# Patient Record
Sex: FEMALE | Race: BLACK OR AFRICAN AMERICAN | NOT HISPANIC OR LATINO
[De-identification: names, ages, dates, MRNs, and addresses within clinical notes are randomized per-mention and may not be internally consistent; named-entity substitution may affect disease eponyms.]

---

## 2018-01-18 ENCOUNTER — APPOINTMENT (OUTPATIENT)
Dept: DERMATOLOGY | Facility: CLINIC | Age: 38
End: 2018-01-18
Payer: COMMERCIAL

## 2018-01-18 VITALS
BODY MASS INDEX: 27.32 KG/M2 | HEIGHT: 66 IN | DIASTOLIC BLOOD PRESSURE: 70 MMHG | SYSTOLIC BLOOD PRESSURE: 110 MMHG | WEIGHT: 170 LBS

## 2018-01-18 DIAGNOSIS — L73.9 FOLLICULAR DISORDER, UNSPECIFIED: ICD-10-CM

## 2018-01-18 DIAGNOSIS — Z12.83 ENCOUNTER FOR SCREENING FOR MALIGNANT NEOPLASM OF SKIN: ICD-10-CM

## 2018-01-18 DIAGNOSIS — L81.4 OTHER MELANIN HYPERPIGMENTATION: ICD-10-CM

## 2018-01-18 DIAGNOSIS — L82.1 OTHER SEBORRHEIC KERATOSIS: ICD-10-CM

## 2018-01-18 PROCEDURE — 99203 OFFICE O/P NEW LOW 30 MIN: CPT | Mod: 25

## 2018-01-18 PROCEDURE — 11900 INJECT SKIN LESIONS </W 7: CPT

## 2018-01-18 RX ORDER — CLINDAMYCIN PHOSPHATE 1 G/10ML
1 GEL TOPICAL
Qty: 1 | Refills: 3 | Status: ACTIVE | COMMUNITY
Start: 2018-01-18 | End: 1900-01-01

## 2018-01-19 PROBLEM — L73.9 FOLLICULITIS: Status: ACTIVE | Noted: 2018-01-18

## 2018-01-19 PROBLEM — L82.1 SEBORRHEIC KERATOSES: Status: ACTIVE | Noted: 2018-01-19

## 2018-10-08 ENCOUNTER — APPOINTMENT (OUTPATIENT)
Dept: ORTHOPEDIC SURGERY | Facility: CLINIC | Age: 38
End: 2018-10-08
Payer: COMMERCIAL

## 2018-10-08 VITALS
DIASTOLIC BLOOD PRESSURE: 75 MMHG | SYSTOLIC BLOOD PRESSURE: 106 MMHG | BODY MASS INDEX: 28.93 KG/M2 | HEART RATE: 86 BPM | HEIGHT: 66 IN | WEIGHT: 180 LBS

## 2018-10-08 DIAGNOSIS — S86.912A STRAIN OF UNSPECIFIED MUSCLE(S) AND TENDON(S) AT LOWER LEG LEVEL, LEFT LEG, INITIAL ENCOUNTER: ICD-10-CM

## 2018-10-08 PROCEDURE — 73562 X-RAY EXAM OF KNEE 3: CPT | Mod: LT

## 2018-10-08 PROCEDURE — 99203 OFFICE O/P NEW LOW 30 MIN: CPT

## 2018-11-12 ENCOUNTER — APPOINTMENT (OUTPATIENT)
Dept: ORTHOPEDIC SURGERY | Facility: CLINIC | Age: 38
End: 2018-11-12
Payer: COMMERCIAL

## 2018-11-12 VITALS
DIASTOLIC BLOOD PRESSURE: 78 MMHG | SYSTOLIC BLOOD PRESSURE: 119 MMHG | BODY MASS INDEX: 28.93 KG/M2 | WEIGHT: 180 LBS | HEART RATE: 87 BPM | HEIGHT: 66 IN

## 2018-11-12 DIAGNOSIS — M75.41 IMPINGEMENT SYNDROME OF RIGHT SHOULDER: ICD-10-CM

## 2018-11-12 DIAGNOSIS — M24.852 OTHER SPECIFIC JOINT DERANGEMENTS OF LEFT HIP, NOT ELSEWHERE CLASSIFIED: ICD-10-CM

## 2018-11-12 PROCEDURE — 99214 OFFICE O/P EST MOD 30 MIN: CPT

## 2018-11-12 PROCEDURE — 73502 X-RAY EXAM HIP UNI 2-3 VIEWS: CPT

## 2018-11-12 PROCEDURE — 73030 X-RAY EXAM OF SHOULDER: CPT | Mod: RT

## 2018-11-18 ENCOUNTER — APPOINTMENT (OUTPATIENT)
Dept: MRI IMAGING | Facility: CLINIC | Age: 38
End: 2018-11-18
Payer: COMMERCIAL

## 2018-11-18 ENCOUNTER — OUTPATIENT (OUTPATIENT)
Dept: OUTPATIENT SERVICES | Facility: HOSPITAL | Age: 38
LOS: 1 days | End: 2018-11-18
Payer: COMMERCIAL

## 2018-11-18 DIAGNOSIS — M75.41 IMPINGEMENT SYNDROME OF RIGHT SHOULDER: ICD-10-CM

## 2018-11-18 DIAGNOSIS — Z98.89 OTHER SPECIFIED POSTPROCEDURAL STATES: Chronic | ICD-10-CM

## 2018-11-18 PROCEDURE — 73221 MRI JOINT UPR EXTREM W/O DYE: CPT

## 2018-11-18 PROCEDURE — 73221 MRI JOINT UPR EXTREM W/O DYE: CPT | Mod: 26,RT

## 2018-11-27 ENCOUNTER — APPOINTMENT (OUTPATIENT)
Dept: INTERVENTIONAL RADIOLOGY/VASCULAR | Facility: CLINIC | Age: 38
End: 2018-11-27

## 2018-12-03 ENCOUNTER — APPOINTMENT (OUTPATIENT)
Dept: ORTHOPEDIC SURGERY | Facility: CLINIC | Age: 38
End: 2018-12-03
Payer: COMMERCIAL

## 2018-12-03 VITALS
HEART RATE: 86 BPM | BODY MASS INDEX: 27.32 KG/M2 | SYSTOLIC BLOOD PRESSURE: 116 MMHG | WEIGHT: 170 LBS | HEIGHT: 66 IN | DIASTOLIC BLOOD PRESSURE: 87 MMHG

## 2018-12-03 DIAGNOSIS — M75.01 ADHESIVE CAPSULITIS OF RIGHT SHOULDER: ICD-10-CM

## 2018-12-03 PROCEDURE — 99214 OFFICE O/P EST MOD 30 MIN: CPT

## 2018-12-08 PROBLEM — M75.01 ADHESIVE CAPSULITIS OF RIGHT SHOULDER: Status: ACTIVE | Noted: 2018-12-08

## 2018-12-11 ENCOUNTER — APPOINTMENT (OUTPATIENT)
Dept: RADIOLOGY | Facility: CLINIC | Age: 38
End: 2018-12-11
Payer: COMMERCIAL

## 2018-12-11 ENCOUNTER — OUTPATIENT (OUTPATIENT)
Dept: OUTPATIENT SERVICES | Facility: HOSPITAL | Age: 38
LOS: 1 days | End: 2018-12-11
Payer: COMMERCIAL

## 2018-12-11 DIAGNOSIS — Z00.8 ENCOUNTER FOR OTHER GENERAL EXAMINATION: ICD-10-CM

## 2018-12-11 DIAGNOSIS — Z98.89 OTHER SPECIFIED POSTPROCEDURAL STATES: Chronic | ICD-10-CM

## 2018-12-11 PROCEDURE — 73040 CONTRAST X-RAY OF SHOULDER: CPT | Mod: 26,RT

## 2018-12-11 PROCEDURE — 73040 CONTRAST X-RAY OF SHOULDER: CPT

## 2018-12-11 PROCEDURE — 23350 INJECTION FOR SHOULDER X-RAY: CPT | Mod: RT

## 2018-12-11 PROCEDURE — 23350 INJECTION FOR SHOULDER X-RAY: CPT

## 2020-05-27 ENCOUNTER — APPOINTMENT (OUTPATIENT)
Dept: OBGYN | Facility: CLINIC | Age: 40
End: 2020-05-27

## 2020-08-19 ENCOUNTER — APPOINTMENT (OUTPATIENT)
Dept: OBGYN | Facility: CLINIC | Age: 40
End: 2020-08-19
Payer: COMMERCIAL

## 2020-08-19 VITALS
WEIGHT: 162 LBS | BODY MASS INDEX: 26.03 KG/M2 | HEIGHT: 66 IN | DIASTOLIC BLOOD PRESSURE: 60 MMHG | SYSTOLIC BLOOD PRESSURE: 100 MMHG

## 2020-08-19 DIAGNOSIS — Z01.419 ENCOUNTER FOR GYNECOLOGICAL EXAMINATION (GENERAL) (ROUTINE) W/OUT ABNORMAL FINDINGS: ICD-10-CM

## 2020-08-19 PROCEDURE — 99385 PREV VISIT NEW AGE 18-39: CPT

## 2020-08-21 NOTE — HISTORY OF PRESENT ILLNESS
[Definite:  ___ (Date)] : the last menstrual period was [unfilled] [Normal Amount/Duration] : was of a normal amount and duration [Regular Cycle Intervals] : periods have been regular [Frequency: Q ___ days] : menstrual periods occur approximately every [unfilled] days [Menarche Age: ____] : age at menarche was [unfilled] [Menstrual Cramps] : menstrual cramps [No] : no [On BCP at conception] : the patient was not on BCP at conception [Spotting Between  Menses] : no spotting between menses [Sexually Active] : is not sexually active [Contraception] : does not use contraception

## 2020-08-24 LAB — CYTOLOGY CVX/VAG DOC THIN PREP: NORMAL

## 2020-12-11 ENCOUNTER — TRANSCRIPTION ENCOUNTER (OUTPATIENT)
Age: 40
End: 2020-12-11

## 2021-01-07 ENCOUNTER — TRANSCRIPTION ENCOUNTER (OUTPATIENT)
Age: 41
End: 2021-01-07

## 2021-01-19 ENCOUNTER — TRANSCRIPTION ENCOUNTER (OUTPATIENT)
Age: 41
End: 2021-01-19

## 2021-01-26 ENCOUNTER — TRANSCRIPTION ENCOUNTER (OUTPATIENT)
Age: 41
End: 2021-01-26

## 2021-07-22 ENCOUNTER — TRANSCRIPTION ENCOUNTER (OUTPATIENT)
Age: 41
End: 2021-07-22

## 2021-09-13 ENCOUNTER — TRANSCRIPTION ENCOUNTER (OUTPATIENT)
Age: 41
End: 2021-09-13

## 2021-09-21 ENCOUNTER — TRANSCRIPTION ENCOUNTER (OUTPATIENT)
Age: 41
End: 2021-09-21

## 2021-09-28 ENCOUNTER — TRANSCRIPTION ENCOUNTER (OUTPATIENT)
Age: 41
End: 2021-09-28

## 2021-10-05 ENCOUNTER — TRANSCRIPTION ENCOUNTER (OUTPATIENT)
Age: 41
End: 2021-10-05

## 2022-02-05 ENCOUNTER — TRANSCRIPTION ENCOUNTER (OUTPATIENT)
Age: 42
End: 2022-02-05

## 2022-02-23 ENCOUNTER — APPOINTMENT (OUTPATIENT)
Dept: ALLERGY | Facility: CLINIC | Age: 42
End: 2022-02-23
Payer: COMMERCIAL

## 2022-02-23 VITALS
TEMPERATURE: 98.5 F | RESPIRATION RATE: 16 BRPM | OXYGEN SATURATION: 98 % | DIASTOLIC BLOOD PRESSURE: 60 MMHG | SYSTOLIC BLOOD PRESSURE: 108 MMHG | HEART RATE: 76 BPM

## 2022-02-23 DIAGNOSIS — L23.9 ALLERGIC CONTACT DERMATITIS, UNSPECIFIED CAUSE: ICD-10-CM

## 2022-02-23 PROCEDURE — 95018 ALL TSTG PERQ&IQ DRUGS/BIOL: CPT

## 2022-02-23 PROCEDURE — 95004 PERQ TESTS W/ALRGNC XTRCS: CPT

## 2022-02-23 PROCEDURE — 99203 OFFICE O/P NEW LOW 30 MIN: CPT | Mod: 25

## 2022-02-23 RX ORDER — MOMETASONE FUROATE 1 MG/G
0.1 CREAM TOPICAL DAILY
Qty: 1 | Refills: 0 | Status: ACTIVE | COMMUNITY
Start: 2022-02-23 | End: 1900-01-01

## 2022-02-23 RX ORDER — HYDROCORTISONE 25 MG/G
2.5 CREAM TOPICAL
Qty: 28 | Refills: 0 | Status: ACTIVE | COMMUNITY
Start: 2021-10-22

## 2022-02-23 RX ORDER — TACROLIMUS 1 MG/G
0.1 OINTMENT TOPICAL
Qty: 100 | Refills: 0 | Status: ACTIVE | COMMUNITY
Start: 2021-10-22

## 2022-02-23 RX ORDER — TRETINOIN 0.25 MG/G
0.03 CREAM TOPICAL
Qty: 45 | Refills: 0 | Status: ACTIVE | COMMUNITY
Start: 2021-11-15

## 2022-02-23 RX ORDER — MUPIROCIN 20 MG/G
2 OINTMENT TOPICAL
Qty: 1 | Refills: 0 | Status: DISCONTINUED | COMMUNITY
Start: 2018-01-18 | End: 2022-02-23

## 2022-02-23 RX ORDER — ERYTHROMYCIN 5 MG/G
5 OINTMENT OPHTHALMIC
Qty: 4 | Refills: 0 | Status: ACTIVE | COMMUNITY
Start: 2021-11-09

## 2022-02-23 NOTE — SOCIAL HISTORY
[House] : [unfilled] lives in a house  [None] : none [Single] : single [de-identified] : lives alone  [FreeTextEntry2] :   [Smokers in Household] : there are no smokers in the home

## 2022-02-23 NOTE — ASSESSMENT
[FreeTextEntry1] : Contact Dermatitis :\par \par No evidence of food allergies\par Elocon cream QD\par RV patch testing

## 2022-02-23 NOTE — HISTORY OF PRESENT ILLNESS
[Asthma] : asthma [Allergic Rhinitis] : allergic rhinitis [Food Allergies] : food allergies [de-identified] : Patient reports 3 weeks ago she awoke with itchy chest - rash on chest - neck - [facility] MD - Claritin and topical hydrocortisone - the rash gradually improved - the rash spread to face and neck.   She is applying Gater Balm to her skin.   Similar reaction in the past about 1 year ago on her neck - she eliminated wine from her diet. \par \par History of eczema - side and back - sample of topical steroid \par \par Facial acne - clindamycin - triretinoin.

## 2022-02-23 NOTE — PHYSICAL EXAM
[Alert] : alert [Well Nourished] : well nourished [Healthy Appearance] : healthy appearance [No Acute Distress] : no acute distress [Well Developed] : well developed [Normal Voice/Communication] : normal voice communication [No Neck Mass] : no neck mass was observed [No LAD] : no lymphadenopathy [No Thyroid Mass] : no thyroid mass [Supple] : the neck was supple [Normal Rate and Effort] : normal respiratory rhythm and effort [No Crackles] : no crackles [No Retractions] : no retractions [Bilateral Audible Breath Sounds] : bilateral audible breath sounds [Wheezing] : no wheezing was heard [Normal Rate] : heart rate was normal  [Normal S1, S2] : normal S1 and S2 [No murmur] : no murmur [Regular Rhythm] : with a regular rhythm [Normal Cervical Lymph Nodes] : cervical [Patches] : ~M patches present [de-identified] : mild erythema neck and face

## 2022-02-28 ENCOUNTER — APPOINTMENT (OUTPATIENT)
Dept: ALLERGY | Facility: CLINIC | Age: 42
End: 2022-02-28
Payer: COMMERCIAL

## 2022-02-28 PROCEDURE — 95044 PATCH/APPLICATION TESTS: CPT

## 2022-03-02 ENCOUNTER — APPOINTMENT (OUTPATIENT)
Dept: ALLERGY | Facility: CLINIC | Age: 42
End: 2022-03-02
Payer: COMMERCIAL

## 2022-03-02 PROCEDURE — 99211 OFF/OP EST MAY X REQ PHY/QHP: CPT

## 2022-03-07 ENCOUNTER — APPOINTMENT (OUTPATIENT)
Dept: ALLERGY | Facility: CLINIC | Age: 42
End: 2022-03-07
Payer: COMMERCIAL

## 2022-03-07 PROCEDURE — 99213 OFFICE O/P EST LOW 20 MIN: CPT | Mod: 95

## 2022-03-07 NOTE — ASSESSMENT
[FreeTextEntry1] : Contact Dermatitis:\par \par I have reviewed results of patch testing and avoidance of ethylacrylate - propolis - bacitracin.   Patient brought to my attention that she has greg plants in her home and there is a sap released and she has on occasion used a plant based oil on her face. \par \par This visit was provided via telehealth using real time 2-way audio visual technology.  The patient, Renuka Mix, was located at home, at the time of the visit.   The provider, Mitchell Boxer, M.D., was located at the office, [address] [place] [address], at the time of the visit.\par \par The patient, Renuka Mix and physician, Mitchell Boxer, M.D., participated in the telehealth encounter.\par \par Verbal consent obtained by  from patient.\par \par The majority of time (>50%) was spent on counseling and coordination of care with this patient and/or family member.  The diagnosis - contact dermatitis \par

## 2022-03-07 NOTE — HISTORY OF PRESENT ILLNESS
[Home] : at home, [unfilled] , at the time of the visit. [Medical Office: ([facility])___] : at the medical office located in  [Verbal consent obtained from patient] : the patient, [unfilled] [de-identified] : Review of results of patch testing

## 2022-03-07 NOTE — PHYSICAL EXAM
[Alert] : alert [Well Nourished] : well nourished [No Acute Distress] : no acute distress [Healthy Appearance] : healthy appearance [Well Developed] : well developed [Normal Voice/Communication] : normal voice communication [Normal Mood] : mood was normal [Normal Affect] : affect was normal [Judgment and Insight Age Appropriate] : judgement and insight is age appropriate [Alert, Awake, Oriented as Age-Appropriate] : alert, awake, oriented as age appropriate No

## 2022-05-09 ENCOUNTER — APPOINTMENT (OUTPATIENT)
Dept: ORTHOPEDIC SURGERY | Facility: CLINIC | Age: 42
End: 2022-05-09
Payer: COMMERCIAL

## 2022-05-09 DIAGNOSIS — M54.50 LOW BACK PAIN, UNSPECIFIED: ICD-10-CM

## 2022-05-09 PROCEDURE — 73564 X-RAY EXAM KNEE 4 OR MORE: CPT | Mod: RT

## 2022-05-09 PROCEDURE — 99204 OFFICE O/P NEW MOD 45 MIN: CPT

## 2022-05-09 RX ORDER — IBUPROFEN 800 MG/1
800 TABLET, FILM COATED ORAL 3 TIMES DAILY
Qty: 30 | Refills: 0 | Status: ACTIVE | COMMUNITY
Start: 2022-05-09 | End: 1900-01-01

## 2022-05-24 ENCOUNTER — APPOINTMENT (OUTPATIENT)
Dept: PHYSICAL MEDICINE AND REHAB | Facility: CLINIC | Age: 42
End: 2022-05-24
Payer: COMMERCIAL

## 2022-06-07 ENCOUNTER — APPOINTMENT (OUTPATIENT)
Dept: PHYSICAL MEDICINE AND REHAB | Facility: CLINIC | Age: 42
End: 2022-06-07
Payer: COMMERCIAL

## 2022-06-07 VITALS
SYSTOLIC BLOOD PRESSURE: 128 MMHG | DIASTOLIC BLOOD PRESSURE: 86 MMHG | HEART RATE: 82 BPM | OXYGEN SATURATION: 98 % | TEMPERATURE: 97.5 F

## 2022-06-07 DIAGNOSIS — M22.2X2 PATELLOFEMORAL DISORDERS, RIGHT KNEE: ICD-10-CM

## 2022-06-07 DIAGNOSIS — M54.17 RADICULOPATHY, LUMBOSACRAL REGION: ICD-10-CM

## 2022-06-07 DIAGNOSIS — M53.3 SACROCOCCYGEAL DISORDERS, NOT ELSEWHERE CLASSIFIED: ICD-10-CM

## 2022-06-07 DIAGNOSIS — S33.6XXA SPRAIN OF SACROILIAC JOINT, INITIAL ENCOUNTER: ICD-10-CM

## 2022-06-07 DIAGNOSIS — M25.652 STIFFNESS OF LEFT HIP, NOT ELSEWHERE CLASSIFIED: ICD-10-CM

## 2022-06-07 DIAGNOSIS — M22.2X1 PATELLOFEMORAL DISORDERS, RIGHT KNEE: ICD-10-CM

## 2022-06-07 DIAGNOSIS — M53.84 OTHER SPECIFIED DORSOPATHIES, THORACIC REGION: ICD-10-CM

## 2022-06-07 PROCEDURE — 99205 OFFICE O/P NEW HI 60 MIN: CPT

## 2022-06-07 RX ORDER — IBUPROFEN 600 MG/1
600 TABLET, FILM COATED ORAL
Qty: 25 | Refills: 0 | Status: ACTIVE | COMMUNITY
Start: 2022-05-09

## 2022-06-07 RX ORDER — AMOXICILLIN 875 MG/1
875 TABLET, FILM COATED ORAL
Qty: 14 | Refills: 0 | Status: ACTIVE | COMMUNITY
Start: 2022-05-09

## 2022-06-07 RX ORDER — LORATADINE 10 MG/1
10 TABLET ORAL
Qty: 7 | Refills: 0 | Status: ACTIVE | COMMUNITY
Start: 2022-02-05

## 2022-06-07 RX ORDER — MELOXICAM 15 MG/1
15 TABLET ORAL
Qty: 30 | Refills: 1 | Status: ACTIVE | COMMUNITY
Start: 2022-06-07 | End: 1900-01-01

## 2022-06-07 NOTE — ASSESSMENT
[FreeTextEntry1] :                                                       Assessment/Plan:\par \par TALYA MUNOZ is a 42 year female with paroxysmal midline sacral/sacrococcygeal pain here for initial consultation.\par \par 1. 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 Hip Flexor Tightness\par 3. Sacrococcygeal pain\par 4. Sprain of the sacroiliac joint region\par 5. Decreased external rotation, hip extension on the Left\par 6. Decreased thoracolumbar range of motion\par \par - Tiers of treatment and management of above diagnosis(es) were discussed with patient\par - Optimal diet, weight, sleep, and lifestyle management to minimize stress and maximize well being counseling provided\par - Imaging reviewed and discussed with patient\par - Reviewed previous encounter notes from 5/9/22 Dr. WILLIAM Zepeda (Orthopedic Sports Medicine)\par - Patient was advised to start a structured, targeted therapy program 2-3x/wk for 8 wks with goal toward HEP\par - Patient was educated on an appropriate home exercise program, provided with exercise recommendations, all questions answered\par - Patient may trial acetaminophen 1000mg up to TID PRN moderate to severe pain and to decrease average consumption of NSAIDs\par - Patient was advised to start Meloxicam 15mg daily PRN with food/milk to help with pain and to decrease inflammation, afterwards as needed; she may use in lieu of ibuprofen 800mg\par - Patient may trial topical lidocaine patches 5% and/or aspercreme with lidocaine patches, 12 hours on and 12 hours off\par - Patient was advised to apply cool compresses or warm heat to affected regions PRN\par - Radiographs of lumbar spine and coccyx ordered today \par \par - Educated about red flag symptoms including (but not limited to) new, worsened, or persistent: fever greater than 100F, bowel or bladder incontinence, bowel obstipation, inability to void urine, urinary leakage, Severe nausea or vomiting, Worsening numbness, worsening tingling/paresthesias, and/or new or progressive motor weakness; advised to seek immediate medical attention at his nearest Emergency department should they experience any of the above\par \par - Follow up in 2 months \par \par I have personally spent a total of at least 60 minutes preparing, reviewing internal and external records, explaining, counseling, and coordinating care for this patient encounter.\par \par Thank you, PIYUSH FRANZ, for allowing me to participate in the care of your patient. Please do not hesitate to contact me with questions/concerns.\par \par Nithin Calhoun M.D.\par Sports and Interventional Spine\par Department of Physical Medicine and Rehabilitation \par [facility] \par Email: [email or web address] \par [facility] [place] \par [place] [facility]\par [address]\[place] [address]\par \par Appointments: [phone]\par Fax: [phone]

## 2022-06-07 NOTE — PHYSICAL EXAM
[FreeTextEntry1] : Gen: A+O x 3 in NAD\par Psych: Normal mood and affect. Responds appropriately to commands\par Eyes: Anicteric. No discharge. EOMI.\par Resp: Breathing unlabored\par CV: DP pulses 2+ and equal. No varicosities noted\par Ext: No c/c/e\par Skin: No lesions noted\par \par Gait: Non antalgic over 200 feet, normal reciprocating heel to toe, able to stand on toes and heels. \par Tandem gait intact\par Romberg negative\par \par Neg Trendelenburg sign with single leg stance\par \par Inspection: Spine alignment is midline.\par Palpation: There is + tenderness over the midline sacral region and over the b/l SIJ\par + over the b/l paravertebral muscles also\par Lumbar ROM: Flexion, extension, side-bending, rotation, limited in most planes\par neg pain with lateral flexion\par neg pain with oblique extension\par neg pain with lateral rotation \par \par Hip ROM: +pain at terminal ROM bilaterally right hip with internal rotation during sitting and with leg roll; terminal external rotation with left hip during FABERE testing\par FAIR + right\par FABERE + left\par \par 	Hip Flex       Knee Ext      Ankle Dorsi           EHL        Ankle Plantar\par Right	5/5	        5/5	                 5/5	          5-/5             5/5                           \par Left	5/5	        5/5	                 5/5	          5/5	             5/5                           \par \par Hip abduction 4+/5 b/l\par Hip adduction 4+/5 b/l\par Hip extension 4/5 b/l \par Knee Flexion 4+/5 R 4/5 L\par \par Tone: Normal. No clonus.\par Sensation: Grossly intact to light touch bilateral lower limbs except for the S1 distribution over the right foot to the lateral malleolus\par Proprioception: Intact at big toes bilaterally.\par Reflexes: 3+ symmetric knee jerk with suprapatellar reflexes, ankle jerk. Plantars downgoing bilaterally.\par SLR negative bilaterally\par Crossed SLR negative bilaterally.\par Slump Test negative bilaterally\par \par prone gapping test + L>R\par yeoman + L>R\par nachlas neg b/l\par active hip extension was more difficult on the left\par Stork + L>R\par supine gaenslen's test neg b/l\par + jacklyn finger on multiple separate attempts/occasions\par + sacral thrust

## 2022-06-07 NOTE — HISTORY OF PRESENT ILLNESS
[FreeTextEntry1] : Nithin Calhoun M.D.\par Sports Medicine and Interventional Spine\par Department of Physical Medicine and Rehabilitation \par [facility] \par Email: [email or web address]>\par \par [facility]\par [facility] [place] [facility]\par [address] [place]\[place] [address]\par \par [facility]\par [facility] [place] at [facility]\par [address] Floor\[place] [address]\par \par [facility] \par [facility]\par [address]\[place] [address]\par \par [facility]\par [address]\[place] [address]\par \par [facility] [place] \par [place] [facility]\par [address] [facility], [address]\[place] [address]\par \par For [place] Appointments\par Phone: [phone]\par Fax: [phone]\par \par For [place] Appointments\par Phone: [phone]\par Fax: [phone]\par \par \par ----------------------------------------------------------------------------------------------------------------------------------------\par \par PATIENT: TALYA MUNOZ \par MRN: [id number] \par YOB: 1980 \par DATE OF VISIT: 06/07/2022 \par Referred by PIYUSH FRANZ\par PCP ADDRESS:\par \par Jun 07, 2022 \par \par \par Dear Dr. EDELMIRA BARAJAS,ELLE STANLEY \par \par Thank you for referring TALYA MUNOZ to my Sports and Interventional Spine practice and office. Enclosed is a copy of the patient's consultation/progress note, which includes my complete assessment and recent studies completed during the patient's evaluation.\par \par If you have questions or have any patients who require nonsurgical, non-opiate management of any sports, spine, or musculoskeletal conditions, please do not hesitate to contact my , Florencia Roberts at [phone].\par \par I look forward to taking care of your patients along with you.\par \par Sincerely,\par \par Nithin\par \par Nithin Calhoun MD\par Sports, Interventional Spine, & Regenerative Musculoskeletal Medicine\par [facility] [place] [facility]\par Email: [email or web address]\par \par \par                                                   Initial Consultation:\par CC: sacral back pain \par \par HPI:  This is the first visit to [facility] [place] [facility].  \par \par TALYA MUNOZ presents with the chief complaint as above.  \par \par Hx:\par Presents in person to [place]\par cites long drive early May 2022 in a rental car as the onset of her most recent back pain exacerbation\par pain localizes to the midline over the sacrum, near the coccygeal\par The patient’s difficulties began recently after a car trip\par The pain is graded as 0/10 at rest, patient intermittently has 6-7/10\par The pain is described as sharp\par The pain is intermittently severe with episodes\par The pain does not radiate\par The patient feels that the pain is overall improved\par Patient denies other recent fall, MVA, injury, trauma, or accident besides presenting history above\par \par Aggravating: ambulation, prolonged standing, sit to stand transitional movements, turning in bed, getting out of bed, spine extension\par Alleviating: rest, activity modification, pharmacologic treatments\par Meds: denies regular PO pain medications; PRN ibuprofen 800mg (1-2x per week)\par Therapy Program: no recent structured targeted therapy program\par HEP: doing HEP regularly\par Previous activity: \par \par Assoc Sx:\par Denies Numbness\par Denies Tingling\par Denies Focal motor weakness in the upper or lower limbs\par Denies New or worsened bowel or bladder incontinence\par Denies Saddle anesthesia\par Denies Using Orthotic(s)\par Denies Swelling in the upper/lower extremities\par They also deny frequent tripping, falling\par \par ROS: A 14 point review of systems was completed. Positive findings are pain as described above. The remaining systems negative.\par \par Breast Cancer Surveillance: up to date\par COVID HX: reviewed\par \par Assoc Hx:\par Of note, patient offers and demonstrates that during squatting or other lowering movements, she tries to tuck her behind as she descends and holds the tuck while she returns to standing position, this helps her pain during certain activities requiring deep hip flexion\par having right knee pain following [facility] marathon 3/2022, also did the [facility] bike in 5/2022\par Ambulates without assistive device\par Injection Hx: denies locally directed treatment to the area in question since [id number]: had multiple epidural (x2, L5-S1), last relief following the last treatment; [facility] [place] [facility] [place]; \par Imaging Hx: reviewed\par \par Level of functioning: indep with ambulation, indep with ADLs\par Living Situation: dwelling with steps to enter

## 2022-07-13 ENCOUNTER — APPOINTMENT (OUTPATIENT)
Dept: ORTHOPEDIC SURGERY | Facility: CLINIC | Age: 42
End: 2022-07-13

## 2022-09-09 ENCOUNTER — NON-APPOINTMENT (OUTPATIENT)
Age: 42
End: 2022-09-09

## 2024-05-20 ENCOUNTER — APPOINTMENT (OUTPATIENT)
Dept: ANTEPARTUM | Facility: CLINIC | Age: 44
End: 2024-05-20

## 2024-09-26 ENCOUNTER — NON-APPOINTMENT (OUTPATIENT)
Age: 44
End: 2024-09-26

## 2024-09-30 ENCOUNTER — APPOINTMENT (OUTPATIENT)
Dept: ORTHOPEDIC SURGERY | Facility: CLINIC | Age: 44
End: 2024-09-30
Payer: COMMERCIAL

## 2024-09-30 VITALS
HEART RATE: 86 BPM | HEIGHT: 66 IN | SYSTOLIC BLOOD PRESSURE: 130 MMHG | DIASTOLIC BLOOD PRESSURE: 88 MMHG | WEIGHT: 185 LBS | OXYGEN SATURATION: 97 % | BODY MASS INDEX: 29.73 KG/M2

## 2024-09-30 DIAGNOSIS — M54.50 LOW BACK PAIN, UNSPECIFIED: ICD-10-CM

## 2024-09-30 PROCEDURE — 72110 X-RAY EXAM L-2 SPINE 4/>VWS: CPT

## 2024-09-30 PROCEDURE — 99204 OFFICE O/P NEW MOD 45 MIN: CPT

## 2024-09-30 RX ORDER — DICLOFENAC SODIUM 75 MG/1
75 TABLET, DELAYED RELEASE ORAL
Qty: 60 | Refills: 0 | Status: ACTIVE | COMMUNITY
Start: 2024-09-30 | End: 1900-01-01

## 2024-09-30 NOTE — ADDENDUM
[FreeTextEntry1] : I, Mary Milan, acted solely as a scribe for Dr. Nahum Orozco MD on this date 09/30/2024  All medical record entries made by the Scribe were at my, Dr. Nahum Orozco MD., direction and personally dictated by me on 09/30/2024. I have reviewed the chart and agree that the record accurately reflects my personal performance of the history, physical exam, assessment and plan. I have also personally directed, reviewed, and agreed with the chart.

## 2024-09-30 NOTE — HISTORY OF PRESENT ILLNESS
[de-identified] : Patient is a 43y/o female who presents for initial evaluation of low back pain. Her pain began happening as of 9/27/24 in her lower back into buttocks and rt leg. She states that she is hinged at the waist so she cannot stand up straight. She went to urgent care and was given Naproxen and a muscle relaxer which was not effective for her pain. Denies any bowel/bladder incontinence. Denies any saddle anesthesia.

## 2024-09-30 NOTE — PHYSICAL EXAM
[de-identified] : Lumbar Physical Exam   Gait - Normal   Station - Normal   Sagittal balance - Normal   Compensatory mechanism? - None   Heel walk - Normal   Toe Walk - Normal   Reflexes  Patellar - normal  Gastroc - normal  Clonus - No   Hip Exam - Normal   Straight leg raise - none   Pulses - 2+ dp/pt   Range of motion - normal   Sensation Sensation intact to light touch in L1, L2, L3, L4, L5 and S1 dermatomes bilaterally   Motor  IP Quad HS TA Gastroc EHL   Right       5/5              5/5         5/5           5/5              5/5        5/5  Left         5/5 5/5 5/5           5/5             5/5        5/5 [de-identified] : Lumbar radiographs Facet arthropathy DDD

## 2024-09-30 NOTE — ASSESSMENT
[FreeTextEntry1] : I had a lengthy discussion with the patient in regard to treatment plan and diagnosis. There are no red flag findings on imaging nor are there any red flag findings on clinical exams. Therefore, we will proceed with a course of conservative treatment. This would include physical therapy/home exercise program, Tylenol/NSAIDs, Diclofenac as medically indicated. The patient will follow up with me in approximately 4 to 5weeks. I encouraged the patient to follow-up sooner if there are any new or worsening symptoms.

## 2024-10-18 ENCOUNTER — APPOINTMENT (OUTPATIENT)
Dept: ORTHOPEDIC SURGERY | Facility: CLINIC | Age: 44
End: 2024-10-18

## 2024-12-19 ENCOUNTER — APPOINTMENT (OUTPATIENT)
Dept: ANTEPARTUM | Facility: CLINIC | Age: 44
End: 2024-12-19
Payer: COMMERCIAL

## 2024-12-19 ENCOUNTER — ASOB RESULT (OUTPATIENT)
Age: 44
End: 2024-12-19

## 2024-12-19 PROCEDURE — 36415 COLL VENOUS BLD VENIPUNCTURE: CPT

## 2024-12-19 PROCEDURE — 93976 VASCULAR STUDY: CPT

## 2024-12-19 PROCEDURE — 76813 OB US NUCHAL MEAS 1 GEST: CPT

## 2024-12-19 PROCEDURE — 76801 OB US < 14 WKS SINGLE FETUS: CPT

## 2025-01-24 ENCOUNTER — APPOINTMENT (OUTPATIENT)
Dept: ANTEPARTUM | Facility: CLINIC | Age: 45
End: 2025-01-24
Payer: COMMERCIAL

## 2025-01-24 ENCOUNTER — ASOB RESULT (OUTPATIENT)
Age: 45
End: 2025-01-24

## 2025-01-24 PROCEDURE — 76805 OB US >/= 14 WKS SNGL FETUS: CPT

## 2025-01-24 PROCEDURE — 76817 TRANSVAGINAL US OBSTETRIC: CPT

## 2025-02-21 ENCOUNTER — ASOB RESULT (OUTPATIENT)
Age: 45
End: 2025-02-21

## 2025-02-21 ENCOUNTER — APPOINTMENT (OUTPATIENT)
Dept: ANTEPARTUM | Facility: CLINIC | Age: 45
End: 2025-02-21
Payer: COMMERCIAL

## 2025-02-21 PROCEDURE — 76817 TRANSVAGINAL US OBSTETRIC: CPT

## 2025-02-21 PROCEDURE — 76811 OB US DETAILED SNGL FETUS: CPT

## 2025-02-25 ENCOUNTER — APPOINTMENT (OUTPATIENT)
Dept: ANTEPARTUM | Facility: CLINIC | Age: 45
End: 2025-02-25

## 2025-03-25 ENCOUNTER — NON-APPOINTMENT (OUTPATIENT)
Age: 45
End: 2025-03-25

## 2025-03-28 ENCOUNTER — APPOINTMENT (OUTPATIENT)
Dept: ANTEPARTUM | Facility: CLINIC | Age: 45
End: 2025-03-28
Payer: COMMERCIAL

## 2025-03-28 ENCOUNTER — ASOB RESULT (OUTPATIENT)
Age: 45
End: 2025-03-28

## 2025-03-28 PROCEDURE — 76820 UMBILICAL ARTERY ECHO: CPT

## 2025-03-28 PROCEDURE — 76819 FETAL BIOPHYS PROFIL W/O NST: CPT

## 2025-03-28 PROCEDURE — 76816 OB US FOLLOW-UP PER FETUS: CPT

## 2025-04-25 ENCOUNTER — APPOINTMENT (OUTPATIENT)
Dept: ANTEPARTUM | Facility: CLINIC | Age: 45
End: 2025-04-25
Payer: COMMERCIAL

## 2025-04-25 ENCOUNTER — ASOB RESULT (OUTPATIENT)
Age: 45
End: 2025-04-25

## 2025-04-25 PROCEDURE — 76816 OB US FOLLOW-UP PER FETUS: CPT

## 2025-04-25 PROCEDURE — 76820 UMBILICAL ARTERY ECHO: CPT | Mod: 59

## 2025-04-25 PROCEDURE — 76819 FETAL BIOPHYS PROFIL W/O NST: CPT | Mod: 59

## 2025-05-22 ENCOUNTER — NON-APPOINTMENT (OUTPATIENT)
Age: 45
End: 2025-05-22

## 2025-05-22 ENCOUNTER — APPOINTMENT (OUTPATIENT)
Age: 45
End: 2025-05-22
Payer: COMMERCIAL

## 2025-05-22 PROCEDURE — 92250 FUNDUS PHOTOGRAPHY W/I&R: CPT

## 2025-05-22 PROCEDURE — 92012 INTRM OPH EXAM EST PATIENT: CPT

## 2025-05-23 ENCOUNTER — ASOB RESULT (OUTPATIENT)
Age: 45
End: 2025-05-23

## 2025-05-23 ENCOUNTER — APPOINTMENT (OUTPATIENT)
Dept: ANTEPARTUM | Facility: CLINIC | Age: 45
End: 2025-05-23
Payer: COMMERCIAL

## 2025-05-23 PROCEDURE — 76816 OB US FOLLOW-UP PER FETUS: CPT

## 2025-05-23 PROCEDURE — 76820 UMBILICAL ARTERY ECHO: CPT | Mod: 59

## 2025-05-23 PROCEDURE — 76819 FETAL BIOPHYS PROFIL W/O NST: CPT

## 2025-06-13 ENCOUNTER — APPOINTMENT (OUTPATIENT)
Dept: ANTEPARTUM | Facility: CLINIC | Age: 45
End: 2025-06-13
Payer: COMMERCIAL

## 2025-06-13 ENCOUNTER — ASOB RESULT (OUTPATIENT)
Age: 45
End: 2025-06-13

## 2025-06-13 PROCEDURE — 76816 OB US FOLLOW-UP PER FETUS: CPT

## 2025-06-13 PROCEDURE — 76820 UMBILICAL ARTERY ECHO: CPT | Mod: 59

## 2025-06-13 PROCEDURE — 76819 FETAL BIOPHYS PROFIL W/O NST: CPT | Mod: 59

## 2025-06-22 ENCOUNTER — INPATIENT (INPATIENT)
Facility: HOSPITAL | Age: 45
LOS: 2 days | Discharge: HOME | End: 2025-06-25
Attending: SPECIALIST | Admitting: SPECIALIST
Payer: COMMERCIAL

## 2025-06-22 VITALS
SYSTOLIC BLOOD PRESSURE: 106 MMHG | DIASTOLIC BLOOD PRESSURE: 73 MMHG | RESPIRATION RATE: 98 BRPM | OXYGEN SATURATION: 100 % | HEART RATE: 100 BPM | TEMPERATURE: 98 F | WEIGHT: 207.9 LBS | HEIGHT: 66 IN

## 2025-06-22 DIAGNOSIS — Z98.89 OTHER SPECIFIED POSTPROCEDURAL STATES: Chronic | ICD-10-CM

## 2025-06-22 NOTE — OB RN PATIENT PROFILE - FUNCTIONAL ASSESSMENT - DAILY ACTIVITY SCORE.
Problem: Patient Care Overview  Goal: Plan of Care Review  Outcome: Ongoing (interventions implemented as appropriate)  Mother will breastfeed baby 8 or more times in 24 hours on baby's cue until content. Ensure a deep latch at each feeding that feels like a pull and tug. Latch should not be painful but may be tender. Observe for signs of milk transfer such as audible swallows and chin pauses during feeding. Mother should keep baby active at the breast by using compression of breast and stimulating baby throughout feeding.  Mother should use her feeding log to keep track of baby's intake and output. Mother should avoid bottles and pacifiers. Call for asst as needed.        24

## 2025-06-22 NOTE — OB RN PATIENT PROFILE - FALL HARM RISK - UNIVERSAL INTERVENTIONS
Bed in lowest position, wheels locked, appropriate side rails in place/Call bell, personal items and telephone in reach/Instruct patient to call for assistance before getting out of bed or chair/Non-slip footwear when patient is out of bed/[place] to call system/Physically safe environment - no spills, clutter or unnecessary equipment/Purposeful Proactive Rounding/Room/bathroom lighting operational, light cord in reach

## 2025-06-23 LAB
ALBUMIN SERPL ELPH-MCNC: 3.4 G/DL — SIGNIFICANT CHANGE UP (ref 3.3–5)
ALP SERPL-CCNC: 140 U/L — HIGH (ref 40–120)
ALT FLD-CCNC: 14 U/L — SIGNIFICANT CHANGE UP (ref 10–45)
ANION GAP SERPL CALC-SCNC: 12 MMOL/L — SIGNIFICANT CHANGE UP (ref 5–17)
AST SERPL-CCNC: 20 U/L — SIGNIFICANT CHANGE UP (ref 10–40)
BASOPHILS # BLD AUTO: 0.03 K/UL — SIGNIFICANT CHANGE UP (ref 0–0.2)
BASOPHILS NFR BLD AUTO: 0.4 % — SIGNIFICANT CHANGE UP (ref 0–2)
BILIRUB SERPL-MCNC: 0.2 MG/DL — SIGNIFICANT CHANGE UP (ref 0.2–1.2)
BLD GP AB SCN SERPL QL: NEGATIVE — SIGNIFICANT CHANGE UP
BUN SERPL-MCNC: 6 MG/DL — LOW (ref 7–23)
CALCIUM SERPL-MCNC: 9.1 MG/DL — SIGNIFICANT CHANGE UP (ref 8.4–10.5)
CHLORIDE SERPL-SCNC: 102 MMOL/L — SIGNIFICANT CHANGE UP (ref 96–108)
CO2 SERPL-SCNC: 20 MMOL/L — LOW (ref 22–31)
CREAT ?TM UR-MCNC: 74 MG/DL — SIGNIFICANT CHANGE UP
CREAT SERPL-MCNC: 0.62 MG/DL — SIGNIFICANT CHANGE UP (ref 0.5–1.3)
EGFR: 113 ML/MIN/1.73M2 — SIGNIFICANT CHANGE UP
EGFR: 113 ML/MIN/1.73M2 — SIGNIFICANT CHANGE UP
EOSINOPHIL # BLD AUTO: 0.06 K/UL — SIGNIFICANT CHANGE UP (ref 0–0.5)
EOSINOPHIL NFR BLD AUTO: 0.9 % — SIGNIFICANT CHANGE UP (ref 0–6)
FIBRINOGEN PPP-MCNC: 356 MG/DL — SIGNIFICANT CHANGE UP (ref 200–445)
GLUCOSE SERPL-MCNC: 82 MG/DL — SIGNIFICANT CHANGE UP (ref 70–99)
HCT VFR BLD CALC: 36.5 % — SIGNIFICANT CHANGE UP (ref 34.5–45)
HGB BLD-MCNC: 12.4 G/DL — SIGNIFICANT CHANGE UP (ref 11.5–15.5)
IMM GRANULOCYTES # BLD AUTO: 0.03 K/UL — SIGNIFICANT CHANGE UP (ref 0–0.07)
IMM GRANULOCYTES NFR BLD AUTO: 0.4 % — SIGNIFICANT CHANGE UP (ref 0–0.9)
LDH SERPL L TO P-CCNC: 190 U/L — SIGNIFICANT CHANGE UP (ref 50–242)
LYMPHOCYTES # BLD AUTO: 1.45 K/UL — SIGNIFICANT CHANGE UP (ref 1–3.3)
LYMPHOCYTES NFR BLD AUTO: 20.6 % — SIGNIFICANT CHANGE UP (ref 13–44)
MCHC RBC-ENTMCNC: 29.9 PG — SIGNIFICANT CHANGE UP (ref 27–34)
MCHC RBC-ENTMCNC: 34 G/DL — SIGNIFICANT CHANGE UP (ref 32–36)
MCV RBC AUTO: 88 FL — SIGNIFICANT CHANGE UP (ref 80–100)
MONOCYTES # BLD AUTO: 0.69 K/UL — SIGNIFICANT CHANGE UP (ref 0–0.9)
MONOCYTES NFR BLD AUTO: 9.8 % — SIGNIFICANT CHANGE UP (ref 2–14)
NEUTROPHILS # BLD AUTO: 4.78 K/UL — SIGNIFICANT CHANGE UP (ref 1.8–7.4)
NEUTROPHILS NFR BLD AUTO: 67.9 % — SIGNIFICANT CHANGE UP (ref 43–77)
NRBC # BLD AUTO: 0 K/UL — SIGNIFICANT CHANGE UP (ref 0–0)
NRBC # FLD: 0 K/UL — SIGNIFICANT CHANGE UP (ref 0–0)
NRBC BLD AUTO-RTO: 0 /100 WBCS — SIGNIFICANT CHANGE UP (ref 0–0)
PLATELET # BLD AUTO: 189 K/UL — SIGNIFICANT CHANGE UP (ref 150–400)
PMV BLD: 11.3 FL — SIGNIFICANT CHANGE UP (ref 7–13)
POTASSIUM SERPL-MCNC: 3.6 MMOL/L — SIGNIFICANT CHANGE UP (ref 3.5–5.3)
POTASSIUM SERPL-SCNC: 3.6 MMOL/L — SIGNIFICANT CHANGE UP (ref 3.5–5.3)
PROT ?TM UR-MCNC: 8 MG/DL — SIGNIFICANT CHANGE UP (ref 0–12)
PROT SERPL-MCNC: 6.2 G/DL — SIGNIFICANT CHANGE UP (ref 6–8.3)
PROT/CREAT UR-RTO: 0.1 RATIO — SIGNIFICANT CHANGE UP (ref 0–0.2)
RBC # BLD: 4.15 M/UL — SIGNIFICANT CHANGE UP (ref 3.8–5.2)
RBC # FLD: 13.7 % — SIGNIFICANT CHANGE UP (ref 10.3–14.5)
RH IG SCN BLD-IMP: POSITIVE — SIGNIFICANT CHANGE UP
RH IG SCN BLD-IMP: POSITIVE — SIGNIFICANT CHANGE UP
SODIUM SERPL-SCNC: 134 MMOL/L — LOW (ref 135–145)
T PALLIDUM AB TITR SER: NEGATIVE — SIGNIFICANT CHANGE UP
URATE SERPL-MCNC: 4.1 MG/DL — SIGNIFICANT CHANGE UP (ref 2.5–7)
WBC # BLD: 7.04 K/UL — SIGNIFICANT CHANGE UP (ref 3.8–10.5)
WBC # FLD AUTO: 7.04 K/UL — SIGNIFICANT CHANGE UP (ref 3.8–10.5)

## 2025-06-23 PROCEDURE — 88307 TISSUE EXAM BY PATHOLOGIST: CPT | Mod: 26

## 2025-06-23 RX ORDER — BENZOCAINE 220 MG/G
1 SPRAY, METERED PERIODONTAL EVERY 6 HOURS
Refills: 0 | Status: DISCONTINUED | OUTPATIENT
Start: 2025-06-23 | End: 2025-06-25

## 2025-06-23 RX ORDER — AMPICILLIN SODIUM 1 G/1
2 INJECTION, POWDER, FOR SOLUTION INTRAMUSCULAR; INTRAVENOUS ONCE
Refills: 0 | Status: COMPLETED | OUTPATIENT
Start: 2025-06-23 | End: 2025-06-23

## 2025-06-23 RX ORDER — MAGNESIUM HYDROXIDE 400 MG/5ML
30 SUSPENSION ORAL
Refills: 0 | Status: DISCONTINUED | OUTPATIENT
Start: 2025-06-23 | End: 2025-06-25

## 2025-06-23 RX ORDER — OXYTOCIN-SODIUM CHLORIDE 0.9% IV SOLN 30 UNIT/500ML 30-0.9/5 UT/ML-%
SOLUTION INTRAVENOUS
Qty: 30 | Refills: 0 | Status: DISCONTINUED | OUTPATIENT
Start: 2025-06-23 | End: 2025-06-23

## 2025-06-23 RX ORDER — FENTANYL/BUPIVACAINE/NS/PF 2MCG/ML-.1
250 PLASTIC BAG, INJECTION (ML) INJECTION
Refills: 0 | Status: DISCONTINUED | OUTPATIENT
Start: 2025-06-23 | End: 2025-06-25

## 2025-06-23 RX ORDER — PRAMOXINE HCL 1 %
1 GEL (GRAM) TOPICAL EVERY 4 HOURS
Refills: 0 | Status: DISCONTINUED | OUTPATIENT
Start: 2025-06-23 | End: 2025-06-25

## 2025-06-23 RX ORDER — OXYCODONE HYDROCHLORIDE 30 MG/1
5 TABLET ORAL ONCE
Refills: 0 | Status: DISCONTINUED | OUTPATIENT
Start: 2025-06-23 | End: 2025-06-25

## 2025-06-23 RX ORDER — PRENATAL 136/IRON/FOLIC ACID 27 MG-1 MG
1 TABLET ORAL DAILY
Refills: 0 | Status: DISCONTINUED | OUTPATIENT
Start: 2025-06-23 | End: 2025-06-25

## 2025-06-23 RX ORDER — WITCH HAZEL LEAF
1 FLUID EXTRACT MISCELLANEOUS EVERY 4 HOURS
Refills: 0 | Status: DISCONTINUED | OUTPATIENT
Start: 2025-06-23 | End: 2025-06-25

## 2025-06-23 RX ORDER — IBUPROFEN 200 MG
600 TABLET ORAL EVERY 6 HOURS
Refills: 0 | Status: COMPLETED | OUTPATIENT
Start: 2025-06-23 | End: 2026-05-22

## 2025-06-23 RX ORDER — IBUPROFEN 200 MG
600 TABLET ORAL EVERY 6 HOURS
Refills: 0 | Status: DISCONTINUED | OUTPATIENT
Start: 2025-06-23 | End: 2025-06-25

## 2025-06-23 RX ORDER — OXYCODONE HYDROCHLORIDE 30 MG/1
5 TABLET ORAL
Refills: 0 | Status: DISCONTINUED | OUTPATIENT
Start: 2025-06-23 | End: 2025-06-25

## 2025-06-23 RX ORDER — DIBUCAINE 10 MG/G
1 OINTMENT TOPICAL EVERY 6 HOURS
Refills: 0 | Status: DISCONTINUED | OUTPATIENT
Start: 2025-06-23 | End: 2025-06-25

## 2025-06-23 RX ORDER — SODIUM CHLORIDE 9 G/1000ML
1000 INJECTION, SOLUTION INTRAVENOUS
Refills: 0 | Status: DISCONTINUED | OUTPATIENT
Start: 2025-06-23 | End: 2025-06-23

## 2025-06-23 RX ORDER — SIMETHICONE 80 MG
80 TABLET,CHEWABLE ORAL EVERY 4 HOURS
Refills: 0 | Status: DISCONTINUED | OUTPATIENT
Start: 2025-06-23 | End: 2025-06-25

## 2025-06-23 RX ORDER — ACETAMINOPHEN 500 MG/5ML
975 LIQUID (ML) ORAL
Refills: 0 | Status: DISCONTINUED | OUTPATIENT
Start: 2025-06-23 | End: 2025-06-25

## 2025-06-23 RX ORDER — MODIFIED LANOLIN 100 %
1 CREAM (GRAM) TOPICAL EVERY 6 HOURS
Refills: 0 | Status: DISCONTINUED | OUTPATIENT
Start: 2025-06-23 | End: 2025-06-25

## 2025-06-23 RX ORDER — DIPHENHYDRAMINE HCL 12.5MG/5ML
25 ELIXIR ORAL EVERY 6 HOURS
Refills: 0 | Status: DISCONTINUED | OUTPATIENT
Start: 2025-06-23 | End: 2025-06-25

## 2025-06-23 RX ORDER — OXYTOCIN-SODIUM CHLORIDE 0.9% IV SOLN 30 UNIT/500ML 30-0.9/5 UT/ML-%
167 SOLUTION INTRAVENOUS
Qty: 30 | Refills: 0 | Status: COMPLETED | OUTPATIENT
Start: 2025-06-23 | End: 2025-06-23

## 2025-06-23 RX ORDER — HYDROCORTISONE 10 MG/G
1 CREAM TOPICAL EVERY 6 HOURS
Refills: 0 | Status: DISCONTINUED | OUTPATIENT
Start: 2025-06-23 | End: 2025-06-25

## 2025-06-23 RX ORDER — AMPICILLIN SODIUM 1 G/1
1 INJECTION, POWDER, FOR SOLUTION INTRAMUSCULAR; INTRAVENOUS EVERY 4 HOURS
Refills: 0 | Status: DISCONTINUED | OUTPATIENT
Start: 2025-06-23 | End: 2025-06-23

## 2025-06-23 RX ORDER — OXYTOCIN-SODIUM CHLORIDE 0.9% IV SOLN 30 UNIT/500ML 30-0.9/5 UT/ML-%
167 SOLUTION INTRAVENOUS
Qty: 30 | Refills: 0 | Status: DISCONTINUED | OUTPATIENT
Start: 2025-06-23 | End: 2025-06-25

## 2025-06-23 RX ORDER — CITRIC ACID/SODIUM CITRATE 300-500 MG
15 SOLUTION, ORAL ORAL EVERY 6 HOURS
Refills: 0 | Status: DISCONTINUED | OUTPATIENT
Start: 2025-06-23 | End: 2025-06-23

## 2025-06-23 RX ORDER — KETOROLAC TROMETHAMINE 30 MG/ML
30 INJECTION, SOLUTION INTRAMUSCULAR; INTRAVENOUS ONCE
Refills: 0 | Status: DISCONTINUED | OUTPATIENT
Start: 2025-06-23 | End: 2025-06-23

## 2025-06-23 RX ADMIN — OXYTOCIN-SODIUM CHLORIDE 0.9% IV SOLN 30 UNIT/500ML 2 MILLIUNIT(S)/MIN: 30-0.9/5 SOLUTION at 07:25

## 2025-06-23 RX ADMIN — AMPICILLIN SODIUM 108 GRAM(S): 1 INJECTION, POWDER, FOR SOLUTION INTRAMUSCULAR; INTRAVENOUS at 12:50

## 2025-06-23 RX ADMIN — Medication 20 MILLIGRAM(S): at 06:29

## 2025-06-23 RX ADMIN — SODIUM CHLORIDE 125 MILLILITER(S): 9 INJECTION, SOLUTION INTRAVENOUS at 00:31

## 2025-06-23 RX ADMIN — AMPICILLIN SODIUM 108 GRAM(S): 1 INJECTION, POWDER, FOR SOLUTION INTRAMUSCULAR; INTRAVENOUS at 04:45

## 2025-06-23 RX ADMIN — KETOROLAC TROMETHAMINE 30 MILLIGRAM(S): 30 INJECTION, SOLUTION INTRAMUSCULAR; INTRAVENOUS at 23:18

## 2025-06-23 RX ADMIN — Medication 250 MILLILITER(S): at 05:29

## 2025-06-23 RX ADMIN — AMPICILLIN SODIUM 108 GRAM(S): 1 INJECTION, POWDER, FOR SOLUTION INTRAMUSCULAR; INTRAVENOUS at 08:32

## 2025-06-23 RX ADMIN — AMPICILLIN SODIUM 108 GRAM(S): 1 INJECTION, POWDER, FOR SOLUTION INTRAMUSCULAR; INTRAVENOUS at 16:48

## 2025-06-23 RX ADMIN — AMPICILLIN SODIUM 216 GRAM(S): 1 INJECTION, POWDER, FOR SOLUTION INTRAMUSCULAR; INTRAVENOUS at 00:45

## 2025-06-23 RX ADMIN — SODIUM CHLORIDE 125 MILLILITER(S): 9 INJECTION, SOLUTION INTRAVENOUS at 05:30

## 2025-06-23 NOTE — OB PROVIDER IHI INDUCTION/AUGMENTATION NOTE - NS_OBIHIFHRDETAILS_OBGYN_ALL_OB_FT
baseline 125bpm, moderate variability, +accels, no decels, some loss of contact noted, will readjust monitor

## 2025-06-23 NOTE — OB PROVIDER LABOR PROGRESS NOTE - ASSESSMENT
Tracing Cat II however remains overall reassuring   Currently fully and pushing  Will monitor for progression of second stage

## 2025-06-23 NOTE — OB PROVIDER LABOR PROGRESS NOTE - ASSESSMENT
Plan:  - continuous maternal and fetal monitoring  - plan to reeaxmine patient at 5:45am, 3 hours after balloon and cytotec placement and reassess contraction pattern: plan for either another 50mcg of vaginal cytotec or pitocin at this time

## 2025-06-23 NOTE — OB PROVIDER H&P - HISTORY OF PRESENT ILLNESS
TALYA [id number]  S: 45yFemalchucky  @ 38w4d presents for induction of labor for preeclampsia without severe features. Patient says she started having elevated blood pressures at 37 weeks, the highest reported was 138/90's. She has been taking her blood pressures at home and its usually is 120's/80's but has reported elevated BP's at home as well. She has also had a 24 hour urine that she says was over 400. Reports +FM, no LOF/VB/CTX. Pr reports HA yesterday that resolved spontaneously Of note, patient has mild HA now. Denies visions changes RUQ/epigastric pain.     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, +hx of ovarian cysts (5.8x5.3x4cm), +hx of fibroids (unknown size), +hx of abnormal pap smears, most recent wnl  PMHx: denies  Surg: R hip arthroscopic surgery in  - unknown if hardware in place  Meds: Valtrex 500 BID, PNV  All: bacitracin (hives)      PE  T(C): 36.8 (25 @ 23:49), Max: 36.8 (25 @ 23:49)  HR: 100 (25 @ 23:49) (100 - 100)  BP: 106/73 (25 @ 23:49) (106/73 - 106/73)  RR: 18 (25 @ 23:49) (18 - 98)  SpO2: 100% (25 @ 23:49) (100% - 100%)  General: No apparent distress; Lying comfortably in bed  Pulmonary: No increased work of breathing  Abdomen: Soft; Nontender; Gravid  Extremities: Trace pedal edema bilaterally; No calf tenderness bilaterally  SVE: /-3  SSE: no lesions noted on speculum exam, mild vaginal discharge noted on speculum    NST: Cat I tracing, baseline 125bpm, moderate variability, no accels, no decels  [place]: no contractions noted on toco, uterine irritability  TAUS: cephalic      Assessment: 45yFemale  @ 38w4d presents for induction of labor for preeclampsia without severe features.     Plan:  - Admit to L&D  - Plan for cook balloon and 25mcg of cytotec, recheck in 3 hours and possible 50mcg of cytotec vs starting pitocin in AM, consents obtained  - IV hydration  - Continuous maternal and fetal monitoring  - Clear diet  - Full labs ordered, pending  - Prenatal labs reviewed - GBS positive, ordered for ampicillin      Discussed with Dr. Tootie Kwon, TUNG TALYA [id number]  S: 45yFemalchucky  @ 38w4d presents for induction of labor for preeclampsia without severe features. Patient says she started having elevated blood pressures at 37 weeks, the highest reported was 138/90's. She has been taking her blood pressures at home and its usually is 120's/80's but has reported elevated BP's at home as well, she does not recall the values at home. She has also had a 24 hour urine that she says was over 400. Reports +FM, no LOF/VB/CTX. Pt reports HA yesterday that resolved spontaneously. without medication. Of note, patient has mild HA now. Patient had 24 hour urine done 6/15 and was 462. Denies visions changes RUQ/epigastric pain.     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 (last outbreak in 2025 denies symptoms of burning/pain at this time), +hx of ovarian cysts (5.8x5.3x4cm), +hx of fibroids (unknown size), +hx of abnormal pap smears, most recent wnl  PMHx: denies  Surg: R hip arthroscopic surgery in  - unknown if hardware in place  Meds: Valtrex 500 BID, PNV  All: bacitracin (hives)      PE  T(C): 36.8 (25 @ 23:49), Max: 36.8 (25 @ 23:49)  HR: 100 (25 @ 23:49) (100 - 100)  BP: 106/73 (25 @ 23:49) (106/73 - 106/73)  RR: 18 (25 @ 23:49) (18 - 98)  SpO2: 100% (25 @ 23:49) (100% - 100%)  General: No apparent distress; Lying comfortably in bed  Pulmonary: No increased work of breathing  Abdomen: Soft; Nontender; Gravid  Extremities: Trace pedal edema bilaterally; No calf tenderness bilaterally  SVE: /-3  SSE: no lesions noted on speculum exam, mild vaginal discharge noted on speculum    NST: Cat I tracing, baseline 125bpm, moderate variability, no accels, no decels  [place]: no contractions noted on toco, uterine irritability  TAUS: cephalic      Assessment: 45yFemale  @ 38w4d presents for induction of labor for preeclampsia without severe features.     Plan:  - Admit to L&D  - patient to eat, reevaluate headache after eating and will start induction  - pepcid for acid reflux  - Plan for cook balloon and 25mcg of cytotec, recheck in 3 hours and possible 50mcg of cytotec vs starting pitocin in AM, consents obtained  - IV hydration  - Continuous maternal and fetal monitoring  - Clear diet  - Full labs ordered, pending  - Prenatal labs reviewed - GBS positive, ordered for ampicillin      Discussed with Dr. Tootie Kwon PA-C TALYA [id number]  S: 45yFemalchucky  @ 38w4d presents for induction of labor for preeclampsia without severe features. Patient says she started having elevated blood pressures at 37 weeks, the highest reported was 138/90's. She has been taking her blood pressures at home and its usually is 120's/80's but has reported elevated BP's at home as well, she does not recall the values at home. Reports +FM, no LOF/VB/CTX. Pt reports headache yesterday that resolved spontaneously without medication. Of note, patient has mild frontal headache now, which she thinks is from not eating much today, she refused tylenol for headache at this time. Patient had 24 hour urine done 6/15 and was 462. Denies visions changes RUQ/epigastric pain.     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 (last outbreak in 2025 denies symptoms of burning/pain at this time), +hx of ovarian cysts (5.8x5.3x4cm), +hx of fibroids (unknown size), +hx of abnormal pap smears, most recent wnl  PMHx: denies  Surg: R hip arthroscopic surgery in  - unknown if hardware in place  Meds: Valtrex 500 BID, PNV  All: bacitracin (hives)      PE  T(C): 36.8 (25 @ 23:49), Max: 36.8 (25 @ 23:49)  HR: 100 (25 @ 23:49) (100 - 100)  BP: 106/73 (25 @ 23:49) (106/73 - 106/73)  RR: 18 (25 @ 23:49) (18 - 98)  SpO2: 100% (25 @ 23:49) (100% - 100%)  General: No apparent distress; Lying comfortably in bed  Pulmonary: No increased work of breathing  Abdomen: Soft; Nontender; Gravid  Extremities: Trace pedal edema bilaterally; No calf tenderness bilaterally  SVE: /-3  SSE: no lesions noted on speculum exam, mild vaginal discharge noted on speculum    NST: Cat I tracing, baseline 125bpm, moderate variability, no accels, no decels  [place]: no contractions noted on toco, uterine irritability  TAUS: cephalic      Assessment: 45yFemale  @ 38w4d presents for induction of labor for preeclampsia without severe features.     Plan:  - Admit to L&D  - patient to eat, reevaluate headache after eating and will start induction  - pepcid for acid reflux  - Plan for cook balloon and 25mcg of cytotec, recheck in 3 hours and possible 50mcg of cytotec vs starting pitocin in AM, consents obtained  - IV hydration  - Continuous maternal and fetal monitoring  - Clear diet  - Full labs ordered, pending  - Prenatal labs reviewed - GBS positive, ordered for ampicillin      Discussed with Dr. Tootie Kwon PA-C

## 2025-06-23 NOTE — OB PROVIDER LABOR PROGRESS NOTE - NS_OBIHIFHRDETAILS_OBGYN_ALL_OB_FT
FHT cat 1  moderate variability +accels no decels
baseline 103/mod waldemar/+accel/non recurrent late and variable decel. Cat II, reassuring with mod waldemar
baseline 135, mod waldemar, +accels, -decels; Cat I tracing
Cat II: 130bpm, mod variability, +accels, +intermittent variable decels
baseline 125, mod waldemar, +accels, +late decelerations x2 back to back each lasting ~2 minutes; Also with loss of contact; Cat II tracing
baseline 125bpm, moderate variability, no accels, no decels.   Patient ambulated to bathroom multiple times hence the disconnection from the monitor.
baseline 130/mod waldemar/+accel/no decel. Cat I
baseline 135bpm, moderate variability, no accels, no decels
baseline 130, mod waldemar, +accels, -decels; Cat I tracing
baseline 135/mod waldemar/+accel/no decel. Cat I
baseline 140, mod waldemar, -accels, +intermittent decels; Cat II tracing however remains overall reassuring with mod waldemar

## 2025-06-23 NOTE — OB RN DELIVERY SUMMARY - NS_SEPSISRSKCALC_OBGYN_ALL_OB_FT
EOS calculated successfully. EOS Risk Factor: 0.5/1000 live births ([facility] national incidence); GA=38w5d; Temp=98.8; ROM=9.6; GBS='Positive'; Antibiotics='Broad spectrum antibiotics 2-3.9 hrs prior to birth'

## 2025-06-23 NOTE — OB RN DELIVERY SUMMARY - NS_LABORONSET_OBGYN_ALL_OB_DT
16 y.o. M was playing soccer, hit/kicked in the ankle by another player, can't bear weight, did not fall, did not feel it twist, denies other injury; on exam pt is wd, wn, nad; left LE - FROM, no deformity, +ttp lat mall, no ttp more proximally or over foot, normal sensation, 2+ dp pulse, skin intact; A/P  - xr ankle, crutches, splint vs ace, f/u ortho outpt 23-Jun-2025 21:00

## 2025-06-23 NOTE — OB PROVIDER LABOR PROGRESS NOTE - NS_OBIHICONTRACTIONPATTERNDETAILS_OBGYN_ALL_OB_FT
ctx q2-4 minutes
ctx q2-5 minutes
q2-3
q2-4 min
ctx not well captured on toco
Ctx q 2-3 min 18mU of pitocin
contractions q4-5 minutes with coupling
ctx q2-4 minutes
genesis irregularly on monitor, 2 in 10 minutes
q2-4 min
toco: ctx none seen

## 2025-06-23 NOTE — OB PROVIDER LABOR PROGRESS NOTE - ASSESSMENT
Plan:  - continuous maternal and fetal monitoring Plan:  - continuous maternal and fetal monitoring  - plan to start pitocin at 6:45am

## 2025-06-23 NOTE — OB PROVIDER LABOR PROGRESS NOTE - ASSESSMENT
Tracing Cat I  Will continue to monitor  Will reassess ctx pattern at 0545 and consider additional cytotec

## 2025-06-23 NOTE — OB PROVIDER LABOR PROGRESS NOTE - ASSESSMENT
Tracing Cat II however remains overall reassuring and patient progressed to fully dilated  Will monitor off pitocin for now   In 20 minutes will assess tracing and may resume pitocin if tracing recovered  Plan to start pushing around that time.   Discussed with attending

## 2025-06-23 NOTE — OB PROVIDER DELIVERY SUMMARY - NSPROVIDERDELIVERYNOTE_OBGYN_ALL_OB_FT
Patient is a 45 y.o.  @38w4d who presented for induction for preeclampsia without severe features on 25. Labor was induced with mohr balloon and pitocin. Patient received an epidural for analgesia, became fully dilated, pushed effectively, and had a . Tight nuchal x1 was noted, cord was cut and clamped. She then spontaneously delivered a liveborn male infant with Apgars 2/8. NICU was called to room for fetal resuscitation and with stimulation Apgars improved. The placenta was delivered spontaneously and intact with 3VC. IV pitocin was started. Cervix was inspected and found to be intact. A 2nd degree perineal laceration was repaired with 2-0 chromic suture without complications. Excellent hemostasis achieved. EBL 300cc. Patient tolerated the procedure well. Mother and infant in stable condition. Dr. Sommers present throughout the procedure.

## 2025-06-23 NOTE — OB RN DELIVERY SUMMARY - NSSELHIDDEN_OBGYN_ALL_OB_FT
[NS_DeliveryAttending1_OBGYN_ALL_OB_FT:[id number]],[NS_DeliveryRN_OBGYN_ALL_OB_FT:[id number]]

## 2025-06-23 NOTE — PRE-ANESTHESIA EVALUATION ADULT - BSA (M2)
"   History & Physical       Patient: Ashley Motta  YOB: 1941  Medical Record Number: [id number]  Wt Readings from Last 3 Encounters:   04/04/23 77.6 kg (171 lb)   03/27/23 77.7 kg (171 lb 3.2 oz)   02/21/23 81.2 kg (179 lb)     Ht Readings from Last 3 Encounters:   04/04/23 160 cm (63\")   03/27/23 160 cm (63\")   02/21/23 160 cm (63\")     Body mass index is 30.29 kg/m².  Facility age limit for growth percentiles is 20 years.    Surgeon:  Dr. Atul Rico    Chief Complaints:   Chief Complaint   Patient presents with   • Right Knee - Follow-up, Pain     Surgery:  Right Total Knee Arthroplasty with [place] Navigation    Subjective:    History of Present Illness: 82 y.o. female presents with   Chief Complaint   Patient presents with   • Right Knee - Follow-up, Pain   . Chronic symptoms have been progressively worsening despite more conservative treatment measures including medications OTC and prescription, cortisone injections and physical therapy.  Symptoms are associated with ability to move, exercise, and perform activities of daily living.  Symptoms are aggravated by weight bearing and ROM necessary for activities of daily living.   Symptoms improve with rest, ice and elevation only minimally.      Allergies: No Known Allergies    Medications:   Home Medications:  Current Outpatient Medications on File Prior to Visit   Medication Sig   • acetaminophen (TYLENOL) 500 MG tablet Take 1 tablet by mouth Every 6 (Six) Hours As Needed for Mild Pain.   • aspirin 81 MG tablet Take 1 tablet by mouth Daily. FOLLOW MD GUIDELINES ON HOLDING FOR DOS   • B Complex Vitamins (VITAMIN B COMPLEX PO) Take 1 tablet by mouth Daily. HOLDING FOR DOS   • Calcium Carbonate-Vitamin D (CALCIUM + D PO) Take 1 tablet by mouth 2 (Two) Times a Day. HOLDING FOR DOS   • CHLORHEXIDINE GLUCONATE CLOTH EX Apply  topically. USE AS DIRECTED   • ECHINACEA-ZINC-VITAMIN C PO Take 1,000 mg by mouth Daily. HOLDING FOR DOS   • fluticasone " (Flonase) 50 MCG/ACT nasal spray 2 sprays into the nostril(s) as directed by provider Daily.   • Magnesium 400 MG tablet Take 1 tablet by mouth Daily. HOLDING FOR DOS   • Misc Natural Products (GLUCOSAMINE CHONDROITIN VIT D3) capsule Take 1 tablet by mouth Daily. HOLDING FOR DOS   • Multiple Vitamins-Minerals (MULTIVITAMIN ADULT PO) Take 1 tablet by mouth Daily. HOLDING FOR DOS   • Omega-3 Fatty Acids (FISH OIL) 1000 MG capsule capsule Take 1 capsule by mouth Daily With Breakfast. HOLDING FOR DOS   • pantoprazole (Protonix) 20 MG EC tablet Take 1 tablet by mouth Daily. Before dinner (Patient taking differently: Take 1 tablet by mouth Daily As Needed. Before dinner)   • rOPINIRole (REQUIP) 1 MG tablet TAKE 1 TABLET BY MOUTH ONE HOUR BEFORE BEDTIME (Patient taking differently: Take 1 tablet by mouth Every Night. TAKE 1 TABLET BY MOUTH ONE HOUR BEFORE BEDTIME)   • simvastatin (ZOCOR) 20 MG tablet TAKE 1 TABLET BY MOUTH ONCE DAILY AT NIGHT (Patient taking differently: Take 1 tablet by mouth Every Night.)   • vitamin E 100 UNIT capsule Take 1 capsule by mouth Daily. HOLDING FOR DOS     No current facility-administered medications on file prior to visit.     Current Medications:  Scheduled Meds:  Continuous Infusions:No current facility-administered medications for this visit.    PRN Meds:.    I have reviewed the patient's medical history in detail and updated the computerized patient record.  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 TERRY CHEUNG   • Osteoarthritis    • Osteopenia    • Postmenopausal atrophic vaginitis    • Rectal nodule 03/2020    REFERRED TO DR. PAMELA NAPOLES   • Rectocele 07/2017   • Restless leg syndrome    • Right knee pain    • RLS (restless legs syndrome)    • Seborrheic keratosis    • Skin cancer 04/2015    LOWER LEG, FOLLOWED BY DR. EDI SANCHEZ   • Vaginal vault prolapse 09/2017   • Vertigo         Past Surgical History:   Procedure Laterality Date   • CATARACT EXTRACTION, BILATERAL Bilateral 2015   • COLONOSCOPY N/A 02/27/2015    1 TUBULAR ADENOMA POLYP IN DISTAL [facility]END[facility]   • COLONOSCOPY N/A 03/04/2020    10 MM SESSILE SERRATED ADENOMA POLYP IN ASCENDING, 3 MM HYPERPLASTIC POLYP IN RECTUM, 7 MM ANAL NODULE, DR. SARAH LIRIANO AT [facility]   • HYSTERECTOMY N/A 01/19/2004    KAYLEE WITH BSO, DR. RAFIA MORTENSEN AT [facility]   • KNEE ARTHROSCOPY Left 2013   • KNEE ARTHROSCOPY Right 2010   • MELISSA CULDOPLASTY N/A 01/19/2004    DR. RAFIA MORTENSEN AT [facility]   • SACROCOLPOPEXY N/A         Social History     Occupational History   • Not on file   Tobacco Use   • Smoking status: Never   • Smokeless tobacco: Never   Vaping Use   • Vaping Use: Never used   Substance and Sexual Activity   • Alcohol use: No   • Drug use: Never   • Sexual activity: Not Currently     Birth control/protection: Post-menopausal, Surgical      Social History     Social History Narrative   • Not on file        Family History   Problem Relation Age of Onset   • Heart disease Mother    • Lung cancer Father    • Hypertension Father    • Heart disease Father    • Cancer Father    • Heart attack Brother    • Emphysema Brother    • COPD Brother    • Depression Brother    • Heart attack Brother         Had stents placed 2009   • Breast cancer Neg Hx    • Malig Hyperthermia Neg Hx        ROS: 14 point review of systems was performed and was negative except for documented findings in HPI and today's encounter.  "      Constitutional:  Denies fever, shaking or chills   Eyes:  Denies change in visual acuity   HENT:  Denies nasal congestion or sore throat   Respiratory:  Denies cough or shortness of breath   Cardiovascular:  Denies chest pain or severe LE edema   GI:  Denies abdominal pain, nausea, vomiting, bloody stools or diarrhea   Musculoskeletal:  Denies numbness tingling or loss of motor function except as outlined above in history of present illness.  : Denies painful urination or hematuria  Integument:  Denies rash, lesion or ulceration   Neurologic:  Denies headache or focal weakness  Endocrine:  Denies lymphadenopathy  Psych:  Denies confusion or change in mental status   Hem:  Denies active bleeding    Physical Exam: 82 y.o. female  Wt Readings from Last 3 Encounters:   04/04/23 77.6 kg (171 lb)   03/27/23 77.7 kg (171 lb 3.2 oz)   02/21/23 81.2 kg (179 lb)     Ht Readings from Last 3 Encounters:   04/04/23 160 cm (63\")   03/27/23 160 cm (63\")   02/21/23 160 cm (63\")     Body mass index is 30.29 kg/m².  Facility age limit for growth percentiles is 20 years.  Vitals:    04/04/23 1252   Temp: 97.9 °F (36.6 °C)       Vital signs reviewed.   General Appearance:    Alert, cooperative, in no acute distress                  Eyes: conjunctiva clear  ENT: external ears and nose atraumatic  CV: no peripheral edema  Resp: normal respiratory effort  Skin: no rashes or wounds; normal turgor  Psych: mood and affect appropriate  Lymph: no nodes appreciated  Neuro: gross sensation intact  Vascular:  Palpable peripheral pulse in noted extremity  Musculoskeletal Extremities: KNEE Exam: medial joint line tenderness with crepitation, synovitis, swelling, and joint effusion right knee.        Diagnostic Tests:  Lab Results   Component Value Date    WBC 6.81 03/27/2023    HGB 14.4 03/27/2023    HCT 44.0 03/27/2023    MCV 90.2 03/27/2023     03/27/2023     Lab Results   Component Value Date    GLUCOSE 105 (H) 03/27/2023    " CALCIUM 9.7 03/27/2023     03/27/2023    K 4.1 03/27/2023    CO2 23.2 03/27/2023     03/27/2023    BUN 8 03/27/2023    CREATININE 0.76 03/27/2023    EGFRRESULT 84 08/16/2022    EGFR 78.8 03/27/2023    BCR 10.5 03/27/2023    ANIONGAP 14.8 03/27/2023       EKG:    Progress Note    HEART RATE= 75  bpm  RR Interval= 800  ms  KY Interval= 131  ms  P Horizontal Axis= 11  deg  P Front Axis= 33  deg  QRSD Interval= 86  ms  QT Interval= 391  ms  QRS Axis= 69  deg  T Wave Axis= 54  deg  - NORMAL ECG -  Sinus rhythm  No change from previous tracing  Electronically Signed By: Kurt Birmingham ([facility]) ([facility]) 27-Mar-2023 15:19:49  Date and Time of Study: 2023-03-27 10:39:28       I have reviewed all the lab & EKG results.     Radiology:   AP, lateral, 40 degree PA of the right knee obtained in the office today due to pain, with comparison views shows advanced tricompartmental degenerative changes, most significantly  medial and patellofemoral compartment with osteophyte formation and subchondral sclerosis      Assessment:  Patient Active Problem List   Diagnosis   • Osteopenia   • Hyperlipidemia   • Pre-diabetes   • Osteoarthritis   • BALDEMAR on CPAP   • Pelvic relaxation due to vaginal prolapse   • Chronic pain of both knees   • Arthritis of right knee   • Arthritis of left knee   • Arthritis of both knees   • Restless leg syndrome   • Dizziness   • Headache, migraine   • Heartburn   • Internal hemorrhoids with complication   • Impacted cerumen of left ear   • Vitamin E deficiency   • Environmental and seasonal allergies   • Chronic cough       Plan:  Dr. Atul Rico reviewed anatomy of a total joint arthroplasty in laymen's terms, as well as typical postoperative recovery and possibly 6-12 months for maximal recovery, and possible need for rehabilitation stay after hospitalization. We also discussed risks, benefits, alternatives, and limitations of procedure with risks including but not limited to neurovascular damage,  bleeding, infection, malalignment, chronic pian, failure of implants, osteolysis, loosening of implants, loss of motion, weakness, stiffness, instability, DVT, pulmonary embolus, death, stroke, complex regional pain syndrome, myocardial infarction, and need for additional procedures. Concept of substitution vs. replacement discussed.  No guarantees were given regarding results of surgery.      Ashley Motta was given the opportunity to ask and have all questions answered today.  The patient voiced understanding of the risks, benefits, and alternative forms of treatment that were discussed and the patient consents to proceed with surgery.       Cleared by PCP on 4/3/2023 Dr. Carbajal    Skin breakdown? WNL  Metal allergy? No  COVID Status:Vaccinated with Booster  DVT Risk Factors: personal history of skin cancer    DVT Prophylaxis:  Eliquis 2.5mg BID x 2 weeks, then resume aspirin 81mg daily   Walker: needs one ordered  Consults: none  Additional orders: none  Pharmacy: rehab    Discharge Plan: POD 1 to [facility] when cleared by physical therapy as safe for discharge - has spoke with facility and verified insurance coverage    To be updated    Date: 4/4/2023  BERNICE Leblanc    Dictated Utilizing Dragon Dictation   2.03

## 2025-06-23 NOTE — OB PROVIDER LABOR PROGRESS NOTE - ASSESSMENT
-cat ii tracing: will reposition and resuscitate as needed  -s/p cytotec, cook balloon, and AROM CF @ 1200  -epidural in situ  -GBS pos on amp  -IOL for PEC w/o SF, continue to monitor BPs and toxic complaints  -continue to monitor and titrate pitocin as tolerated      AME RUBY

## 2025-06-24 ENCOUNTER — TRANSCRIPTION ENCOUNTER (OUTPATIENT)
Age: 45
End: 2025-06-24

## 2025-06-24 LAB
ALBUMIN SERPL ELPH-MCNC: 2.8 G/DL — LOW (ref 3.3–5)
ALP SERPL-CCNC: 109 U/L — SIGNIFICANT CHANGE UP (ref 40–120)
ALT FLD-CCNC: 14 U/L — SIGNIFICANT CHANGE UP (ref 10–45)
ANION GAP SERPL CALC-SCNC: 10 MMOL/L — SIGNIFICANT CHANGE UP (ref 5–17)
AST SERPL-CCNC: 31 U/L — SIGNIFICANT CHANGE UP (ref 10–40)
BILIRUB SERPL-MCNC: 0.2 MG/DL — SIGNIFICANT CHANGE UP (ref 0.2–1.2)
BUN SERPL-MCNC: 8 MG/DL — SIGNIFICANT CHANGE UP (ref 7–23)
CALCIUM SERPL-MCNC: 8.4 MG/DL — SIGNIFICANT CHANGE UP (ref 8.4–10.5)
CHLORIDE SERPL-SCNC: 107 MMOL/L — SIGNIFICANT CHANGE UP (ref 96–108)
CO2 SERPL-SCNC: 22 MMOL/L — SIGNIFICANT CHANGE UP (ref 22–31)
CREAT SERPL-MCNC: 0.68 MG/DL — SIGNIFICANT CHANGE UP (ref 0.5–1.3)
EGFR: 110 ML/MIN/1.73M2 — SIGNIFICANT CHANGE UP
EGFR: 110 ML/MIN/1.73M2 — SIGNIFICANT CHANGE UP
GLUCOSE SERPL-MCNC: 93 MG/DL — SIGNIFICANT CHANGE UP (ref 70–99)
HCT VFR BLD CALC: 29.1 % — LOW (ref 34.5–45)
HGB BLD-MCNC: 9.9 G/DL — LOW (ref 11.5–15.5)
MCHC RBC-ENTMCNC: 30.2 PG — SIGNIFICANT CHANGE UP (ref 27–34)
MCHC RBC-ENTMCNC: 34 G/DL — SIGNIFICANT CHANGE UP (ref 32–36)
MCV RBC AUTO: 88.7 FL — SIGNIFICANT CHANGE UP (ref 80–100)
NRBC # BLD AUTO: 0 K/UL — SIGNIFICANT CHANGE UP (ref 0–0)
NRBC # FLD: 0 K/UL — SIGNIFICANT CHANGE UP (ref 0–0)
NRBC BLD AUTO-RTO: 0 /100 WBCS — SIGNIFICANT CHANGE UP (ref 0–0)
PLATELET # BLD AUTO: 170 K/UL — SIGNIFICANT CHANGE UP (ref 150–400)
PMV BLD: 11.2 FL — SIGNIFICANT CHANGE UP (ref 7–13)
POTASSIUM SERPL-MCNC: 3.6 MMOL/L — SIGNIFICANT CHANGE UP (ref 3.5–5.3)
POTASSIUM SERPL-SCNC: 3.6 MMOL/L — SIGNIFICANT CHANGE UP (ref 3.5–5.3)
PROT SERPL-MCNC: 5 G/DL — LOW (ref 6–8.3)
RBC # BLD: 3.28 M/UL — LOW (ref 3.8–5.2)
RBC # FLD: 14.2 % — SIGNIFICANT CHANGE UP (ref 10.3–14.5)
SODIUM SERPL-SCNC: 139 MMOL/L — SIGNIFICANT CHANGE UP (ref 135–145)
WBC # BLD: 16.24 K/UL — HIGH (ref 3.8–10.5)
WBC # FLD AUTO: 16.24 K/UL — HIGH (ref 3.8–10.5)

## 2025-06-24 PROCEDURE — 84156 ASSAY OF PROTEIN URINE: CPT

## 2025-06-24 PROCEDURE — 80053 COMPREHEN METABOLIC PANEL: CPT

## 2025-06-24 PROCEDURE — 83615 LACTATE (LD) (LDH) ENZYME: CPT

## 2025-06-24 PROCEDURE — 82570 ASSAY OF URINE CREATININE: CPT

## 2025-06-24 PROCEDURE — 86900 BLOOD TYPING SEROLOGIC ABO: CPT

## 2025-06-24 PROCEDURE — 59050 FETAL MONITOR W/REPORT: CPT

## 2025-06-24 PROCEDURE — 85384 FIBRINOGEN ACTIVITY: CPT

## 2025-06-24 PROCEDURE — 86850 RBC ANTIBODY SCREEN: CPT

## 2025-06-24 PROCEDURE — 84550 ASSAY OF BLOOD/URIC ACID: CPT

## 2025-06-24 PROCEDURE — 85027 COMPLETE CBC AUTOMATED: CPT

## 2025-06-24 PROCEDURE — 86780 TREPONEMA PALLIDUM: CPT

## 2025-06-24 PROCEDURE — 36415 COLL VENOUS BLD VENIPUNCTURE: CPT

## 2025-06-24 PROCEDURE — 86901 BLOOD TYPING SEROLOGIC RH(D): CPT

## 2025-06-24 PROCEDURE — 85025 COMPLETE CBC W/AUTO DIFF WBC: CPT

## 2025-06-24 RX ORDER — IBUPROFEN 200 MG
1 TABLET ORAL
Qty: 0 | Refills: 0 | DISCHARGE
Start: 2025-06-24

## 2025-06-24 RX ORDER — BENZOCAINE 220 MG/G
1 SPRAY, METERED PERIODONTAL
Qty: 0 | Refills: 0 | DISCHARGE
Start: 2025-06-24

## 2025-06-24 RX ORDER — ACETAMINOPHEN 500 MG/5ML
3 LIQUID (ML) ORAL
Qty: 0 | Refills: 0 | DISCHARGE
Start: 2025-06-24

## 2025-06-24 RX ADMIN — DIBUCAINE 1 APPLICATION(S): 10 OINTMENT TOPICAL at 18:11

## 2025-06-24 RX ADMIN — Medication 1 TABLET(S): at 12:13

## 2025-06-24 RX ADMIN — Medication 600 MILLIGRAM(S): at 23:03

## 2025-06-24 RX ADMIN — Medication 1 APPLICATION(S): at 18:12

## 2025-06-24 RX ADMIN — Medication 975 MILLIGRAM(S): at 09:03

## 2025-06-24 RX ADMIN — Medication 975 MILLIGRAM(S): at 15:14

## 2025-06-24 RX ADMIN — BENZOCAINE 1 SPRAY(S): 220 SPRAY, METERED PERIODONTAL at 18:11

## 2025-06-24 RX ADMIN — Medication 3 MILLILITER(S): at 20:38

## 2025-06-24 RX ADMIN — Medication 3 MILLILITER(S): at 14:30

## 2025-06-24 RX ADMIN — Medication 975 MILLIGRAM(S): at 20:38

## 2025-06-24 RX ADMIN — Medication 600 MILLIGRAM(S): at 12:13

## 2025-06-24 RX ADMIN — Medication 600 MILLIGRAM(S): at 18:12

## 2025-06-24 NOTE — DISCHARGE NOTE OB - MEDICATION SUMMARY - MEDICATIONS TO STOP TAKING
I will STOP taking the medications listed below when I get home from the hospital:    diazepam 5 mg oral tablet  -- 1 tab(s) by mouth every 8 hours, As Needed - for severe pain  -- Caution federal law prohibits the transfer of this drug to any person other  than the person for whom it was prescribed.  Do not take this drug if you are pregnant.  May cause drowsiness.  Alcohol may intensify this effect.  Use care when operating dangerous machinery.    CeleBREX  --  by mouth

## 2025-06-24 NOTE — DISCHARGE NOTE OB - CARE PROVIDER_API CALL
Naomie Sommers  Obstetrics & Gynecology  [address] [place] [id number]  Phone: [phone]  Fax: [phone]  Follow Up Time: 1 week

## 2025-06-24 NOTE — LACTATION INITIAL EVALUATION - NS LACT CON REASON FOR REQ
Seen mother baby at bedside at baby's 13hours of life. Baby was asleep at the time of consult. Normal  behavior discussed. Strategies to wake up a sleepy baby demonstrated. Mother has large breast, large everted nipples. Hand expression demonstrated. No expressible colostrum noted. Skin to skin done. Positioning and latching techniques demonstrated. Skin to skin done and baby became more awake afterwards and started showing early feeding cues. Mother was able to latch baby in football position onto the right breast. Baby was able to latch deeply with widely flanged lips, then developed strong rhythmic sucks, sustained for about 30-35 minutes until baby self detached and appeared satisfied. Complete breastfeeding education given. Responsive feeding and skin to skin encouraged. All questions answered. Primary nurse is aware of the above. Tele lactation to followup with mother after discharge./primaparous mom

## 2025-06-24 NOTE — DISCHARGE NOTE OB - CARE PLAN
Principal Discharge DX:	Vaginal delivery  Assessment and plan of treatment:	Vaginal delivery, meeting all postpartum milestones.  Please follow-up with your OB doctor in 1 week for blood pressure check.  You can resume a regular diet at home and may continue your prenatal vitamins as directed.  Please place nothing in the vagina for 6 weeks (no tampons, sex, douching, tub baths, swimming pools, etc).  If you have severe headaches and/or vision changes, heavy bleeding, or chest pain, please call your provider or go to the nearest Emergency Department.  Please call your OB with any signs of symptoms of infection including fever > 100.4 degrees, severe pain, malodorous vaginal discharge or heavy bleeding requiring more than 1-2 pads/hour.  You can take Motrin 600mg orally every 6 hours and Tylenol 1000mg orally every 6 hours for pain as needed.  Secondary Diagnosis:	Preeclampsia, third trimester  Assessment and plan of treatment:	Monitor blood pressure twice daily.  Document blood pressures to review with obstetrician at follow-up appointment.  Call doctor for persistent systolic blood pressure (top number) equal or greater to 150 AND/OR diastolic blood pressure (bottom number) equal or above 100.      Return to hospital for systolic blood pressure (top number) equal to or greater than 160 AND/OR diastolic blood pressure (bottom number) equal to or greater than 110 OR any of the following symptoms: changes in vision, headache not relieved with Tylenol, severe abdominal pain, vomiting, increased vaginal bleeding, chest pain, or shortness of breath.  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

## 2025-06-24 NOTE — DISCHARGE NOTE OB - CALL YOUR HEALTHCARE PROVIDER IF YOU ARE EXPERIENCING SYMPTOMS OF DEPRESSION THAT LAST MORE THAN THREE DAYS
Occupational Therapy                 Therapy Communication Note    Patient Name: Claribel Lomas  MRN: [id number]  Department:   Room: Room/bed info not found  Today's Date: 3/20/2025     Discipline: Occupational Therapy          Missed Visit Reason:      Missed Time: No Show    Comment: called pt and pt reporting forgot appt.  Notified pt this is 1 of 3 no show and then DC.  Pt in agreement      Statement Selected

## 2025-06-24 NOTE — DISCHARGE NOTE OB - HOSPITAL COURSE
Admitted for induction of labor in the setting of PEC w/o SF.  Uncomplicated  and postpartum course.  Normotensive postpartum, no need for oral antihypertensives.

## 2025-06-24 NOTE — DISCHARGE NOTE OB - PLAN OF CARE
Monitor blood pressure twice daily.  Document blood pressures to review with obstetrician at follow-up appointment.  Call doctor for persistent systolic blood pressure (top number) equal or greater to 150 AND/OR diastolic blood pressure (bottom number) equal or above 100.      Return to hospital for systolic blood pressure (top number) equal to or greater than 160 AND/OR diastolic blood pressure (bottom number) equal to or greater than 110 OR any of the following symptoms: changes in vision, headache not relieved with Tylenol, severe abdominal pain, vomiting, increased vaginal bleeding, chest pain, or shortness of breath. Vaginal delivery, meeting all postpartum milestones.  Please follow-up with your OB doctor in 1 week for blood pressure check.  You can resume a regular diet at home and may continue your prenatal vitamins as directed.  Please place nothing in the vagina for 6 weeks (no tampons, sex, douching, tub baths, swimming pools, etc).  If you have severe headaches and/or vision changes, heavy bleeding, or chest pain, please call your provider or go to the nearest Emergency Department.  Please call your OB with any signs of symptoms of infection including fever > 100.4 degrees, severe pain, malodorous vaginal discharge or heavy bleeding requiring more than 1-2 pads/hour.  You can take Motrin 600mg orally every 6 hours and Tylenol 1000mg orally every 6 hours for pain as needed.

## 2025-06-24 NOTE — LACTATION INITIAL EVALUATION - LACTATION INTERVENTIONS
initiate/review safe skin-to-skin/initiate/review hand expression/initiate/review techniques for position and latch/post discharge community resources provided/initiate/review breast massage/compression/reviewed components of an effective feeding and at least 8 effective feedings per day required/reviewed importance of monitoring infant diapers, the breastfeeding log, and minimum output each day/reviewed risks of unnecessary formula supplementation/reviewed risks of artificial nipples/reviewed benefits and recommendations for rooming in/reviewed feeding on demand/by cue at least 8 times a day

## 2025-06-24 NOTE — PROGRESS NOTE ADULT - SUBJECTIVE AND OBJECTIVE BOX
Patient evaluated at bedside this morning, resting comfortable in bed, no acute events overnight. Vital signs stable overnight.  She reports pain is well controlled with tylenol and motrin.  She denies  nausea, vomiting, heavy vaginal bleeding.  She has been ambulating without assistance, voiding spontaneously.  Tolerating food well, without nausea/vomit.      Physical Exam:  T(C): 36.7 (25 @ 06:00), Max: 37.1 (25 @ 22:00)  HR: 95 (25 @ 06:00) (95 - 107)  BP: 131/83 (25 @ 06:00) (109/75 - 131/83)  RR: 18 (25 @ 06:00) (17 - 18)  SpO2: 98% (25 @ 06:00) (98% - 99%)    GA: NAD, A&O x 3  Pulm: no increased work of breathing  Abd: soft, nontender, nondistended, no rebound or guarding, uterus firm.  Extremities: no swelling or calf tenderness                          12.4   7.04  )-----------( 189      ( 2025 00:22 )             36.5     06-23    134[L]  |  102  |  6[L]  ----------------------------<  82  3.6   |  20[L]  |  0.62    Ca    9.1      2025 01:02    TPro  6.2  /  Alb  3.4  /  TBili  0.2  /  DBili  x   /  AST  20  /  ALT  14  /  AlkPhos  140[H]  06-23    acetaminophen     Tablet .. 975 milliGRAM(s) Oral <User Schedule>  benzocaine 20%/menthol 0.5% Spray 1 Spray(s) Topical every 6 hours PRN  dibucaine 1% Ointment 1 Application(s) Topical every 6 hours PRN  diphenhydrAMINE 25 milliGRAM(s) Oral every 6 hours PRN  diphtheria/tetanus/pertussis (acellular) Vaccine (Adacel) 0.5 milliLiter(s) IntraMuscular once  fentanyl (2 MICROgram(s)/mL) + bupivacaine 0.0625%  in 0.9% Sodium Chloride PCEA 250 milliLiter(s) Epidural PCA Continuous  hydrocortisone 1% Cream 1 Application(s) Topical every 6 hours PRN  ibuprofen  Tablet. 600 milliGRAM(s) Oral every 6 hours  lanolin Ointment 1 Application(s) Topical every 6 hours PRN  magnesium hydroxide Suspension 30 milliLiter(s) Oral two times a day PRN  oxyCODONE    IR 5 milliGRAM(s) Oral every 3 hours PRN  oxyCODONE    IR 5 milliGRAM(s) Oral once PRN  oxytocin Infusion 167 milliUNIT(s)/Min IV Continuous <Continuous>  pramoxine 1%/zinc 5% Cream 1 Application(s) Topical every 4 hours PRN  prenatal multivitamin 1 Tablet(s) Oral daily  simethicone 80 milliGRAM(s) Chew every 4 hours PRN  sodium chloride 0.9% lock flush 3 milliLiter(s) IV Push every 8 hours  witch hazel Pads 1 Application(s) Topical every 4 hours PRN        ==  A/P   Pt s/p , PPD#1 , stable, meeting postpartum milestones   #PECw/oSF  - VSS  - VSQ4  #PPC  - Pain: well controlled on tylenol/motrin  - GI: Tolerating regular diet  - : urinating without difficulty/pain  - DVT prophylaxis: ambulating frequently  - Dispo: PPD 2, unless otherwise specified     Patient evaluated at bedside this morning, resting comfortable in bed, no acute events overnight. Vital signs stable overnight.  She reports pain is well controlled with tylenol and motrin.  She denies  nausea, vomiting, heavy vaginal bleeding.  Ambulated w/ RN to urinate without difficulty  Tolerating food well, without nausea/vomit.      Physical Exam:  T(C): 36.7 (25 @ 06:00), Max: 37.1 (25 @ 22:00)  HR: 95 (25 @ 06:00) (95 - 107)  BP: 131/83 (25 @ 06:00) (109/75 - 131/83)  RR: 18 (25 @ 06:00) (17 - 18)  SpO2: 98% (25 @ 06:00) (98% - 99%)    GA: NAD, A&O x 3  Pulm: no increased work of breathing  Abd: soft, nontender, nondistended, no rebound or guarding, uterus firm.  Extremities: no swelling or calf tenderness                          12.4   7.04  )-----------( 189      ( 2025 00:22 )             36.5     06-23    134[L]  |  102  |  6[L]  ----------------------------<  82  3.6   |  20[L]  |  0.62    Ca    9.1      2025 01:02    TPro  6.2  /  Alb  3.4  /  TBili  0.2  /  DBili  x   /  AST  20  /  ALT  14  /  AlkPhos  140[H]  06-23    acetaminophen     Tablet .. 975 milliGRAM(s) Oral <User Schedule>  benzocaine 20%/menthol 0.5% Spray 1 Spray(s) Topical every 6 hours PRN  dibucaine 1% Ointment 1 Application(s) Topical every 6 hours PRN  diphenhydrAMINE 25 milliGRAM(s) Oral every 6 hours PRN  diphtheria/tetanus/pertussis (acellular) Vaccine (Adacel) 0.5 milliLiter(s) IntraMuscular once  fentanyl (2 MICROgram(s)/mL) + bupivacaine 0.0625%  in 0.9% Sodium Chloride PCEA 250 milliLiter(s) Epidural PCA Continuous  hydrocortisone 1% Cream 1 Application(s) Topical every 6 hours PRN  ibuprofen  Tablet. 600 milliGRAM(s) Oral every 6 hours  lanolin Ointment 1 Application(s) Topical every 6 hours PRN  magnesium hydroxide Suspension 30 milliLiter(s) Oral two times a day PRN  oxyCODONE    IR 5 milliGRAM(s) Oral every 3 hours PRN  oxyCODONE    IR 5 milliGRAM(s) Oral once PRN  oxytocin Infusion 167 milliUNIT(s)/Min IV Continuous <Continuous>  pramoxine 1%/zinc 5% Cream 1 Application(s) Topical every 4 hours PRN  prenatal multivitamin 1 Tablet(s) Oral daily  simethicone 80 milliGRAM(s) Chew every 4 hours PRN  sodium chloride 0.9% lock flush 3 milliLiter(s) IV Push every 8 hours  witch hazel Pads 1 Application(s) Topical every 4 hours PRN        ==  A/P   Pt s/p , PPD#1 , stable, meeting postpartum milestones   #PECw/oSF  - VSS  - VSQ4  #PPC  - Pain: well controlled on tylenol/motrin  - GI: Tolerating regular diet  - : urinating without difficulty/pain  - DVT prophylaxis: ambulating frequently  - Dispo: PPD 2, unless otherwise specified

## 2025-06-24 NOTE — LACTATION INITIAL EVALUATION - DELIVERY MODE
Have Your Spot(S) Been Treated In The Past?: has not been treated Hpi Title: Evaluation of Skin Lesions Year Removed: 1900 breast

## 2025-06-24 NOTE — DISCHARGE NOTE OB - FINANCIAL ASSISTANCE
[facility] provides services at a reduced cost to those who are determined to be eligible through [facility]’s financial assistance program. Information regarding [facility]’s financial assistance program can be found by going to [email or web address] or by calling [phone].

## 2025-06-24 NOTE — DISCHARGE NOTE OB - PATIENT PORTAL LINK FT
You can access the FollowMyHealth Patient Portal offered by [facility] by registering at the following website: [email or web address]. By joining [facility]’s FollowMyHealth portal, you will also be able to view your health information using other applications (apps) compatible with our system.

## 2025-06-25 VITALS
SYSTOLIC BLOOD PRESSURE: 119 MMHG | RESPIRATION RATE: 16 BRPM | OXYGEN SATURATION: 98 % | DIASTOLIC BLOOD PRESSURE: 84 MMHG | TEMPERATURE: 98 F | HEART RATE: 74 BPM

## 2025-06-25 PROCEDURE — 85025 COMPLETE CBC W/AUTO DIFF WBC: CPT

## 2025-06-25 PROCEDURE — 86900 BLOOD TYPING SEROLOGIC ABO: CPT

## 2025-06-25 PROCEDURE — 85384 FIBRINOGEN ACTIVITY: CPT

## 2025-06-25 PROCEDURE — 86850 RBC ANTIBODY SCREEN: CPT

## 2025-06-25 PROCEDURE — 84550 ASSAY OF BLOOD/URIC ACID: CPT

## 2025-06-25 PROCEDURE — 86901 BLOOD TYPING SEROLOGIC RH(D): CPT

## 2025-06-25 PROCEDURE — 59050 FETAL MONITOR W/REPORT: CPT

## 2025-06-25 PROCEDURE — 84156 ASSAY OF PROTEIN URINE: CPT

## 2025-06-25 PROCEDURE — 85027 COMPLETE CBC AUTOMATED: CPT

## 2025-06-25 PROCEDURE — 36415 COLL VENOUS BLD VENIPUNCTURE: CPT

## 2025-06-25 PROCEDURE — 82570 ASSAY OF URINE CREATININE: CPT

## 2025-06-25 PROCEDURE — 86780 TREPONEMA PALLIDUM: CPT

## 2025-06-25 PROCEDURE — 80053 COMPREHEN METABOLIC PANEL: CPT

## 2025-06-25 PROCEDURE — 83615 LACTATE (LD) (LDH) ENZYME: CPT

## 2025-06-25 PROCEDURE — 88307 TISSUE EXAM BY PATHOLOGIST: CPT

## 2025-06-25 RX ADMIN — Medication 975 MILLIGRAM(S): at 14:52

## 2025-06-25 RX ADMIN — Medication 600 MILLIGRAM(S): at 06:14

## 2025-06-25 RX ADMIN — Medication 1 TABLET(S): at 12:04

## 2025-06-25 RX ADMIN — Medication 600 MILLIGRAM(S): at 12:03

## 2025-06-25 NOTE — PROGRESS NOTE ADULT - SUBJECTIVE AND OBJECTIVE BOX
Patient evaluated at bedside this morning, resting comfortable in bed, no acute events overnight. Vital signs stable overnight.  She reports pain is well controlled with tylenol and motrin.  She denies nausea, vomiting, heavy vaginal bleeding. She has been ambulating without assistance, voiding spontaneously.  Tolerating food well, without nausea/vomit.      Physical Exam:  T(C): 37.1 (25 @ 06:00), Max: 37.1 (25 @ 06:00)  HR: 95 (25 @ 06:00) (95 - 106)  BP: 97/60 (25 @ 06:00) (97/60 - 125/83)  RR: 18 (25 @ 06:00) (18 - 18)  SpO2: 97% (25 @ 06:00) (97% - 99%)    GA: NAD, A&O x 3  Pulm: no increased work of breathing  Abd: soft, nontender, nondistended, no rebound or guarding, uterus firm.  Extremities: no swelling or calf tenderness                          9.9    16.24 )-----------( 170      ( 2025 19:40 )             29.1     -    139  |  107  |  8   ----------------------------<  93  3.6   |  22  |  0.68    Ca    8.4      2025 19:40    TPro  5.0[L]  /  Alb  2.8[L]  /  TBili  0.2  /  DBili  x   /  AST  31  /  ALT  14  /  AlkPhos  109  -    acetaminophen     Tablet .. 975 milliGRAM(s) Oral <User Schedule>  benzocaine 20%/menthol 0.5% Spray 1 Spray(s) Topical every 6 hours PRN  dibucaine 1% Ointment 1 Application(s) Topical every 6 hours PRN  diphenhydrAMINE 25 milliGRAM(s) Oral every 6 hours PRN  diphtheria/tetanus/pertussis (acellular) Vaccine (Adacel) 0.5 milliLiter(s) IntraMuscular once  fentanyl (2 MICROgram(s)/mL) + bupivacaine 0.0625%  in 0.9% Sodium Chloride PCEA 250 milliLiter(s) Epidural PCA Continuous  hydrocortisone 1% Cream 1 Application(s) Topical every 6 hours PRN  ibuprofen  Tablet. 600 milliGRAM(s) Oral every 6 hours  lanolin Ointment 1 Application(s) Topical every 6 hours PRN  magnesium hydroxide Suspension 30 milliLiter(s) Oral two times a day PRN  oxyCODONE    IR 5 milliGRAM(s) Oral every 3 hours PRN  oxyCODONE    IR 5 milliGRAM(s) Oral once PRN  oxytocin Infusion 167 milliUNIT(s)/Min IV Continuous <Continuous>  pramoxine 1%/zinc 5% Cream 1 Application(s) Topical every 4 hours PRN  prenatal multivitamin 1 Tablet(s) Oral daily  simethicone 80 milliGRAM(s) Chew every 4 hours PRN  sodium chloride 0.9% lock flush 3 milliLiter(s) IV Push every 8 hours  witch hazel Pads 1 Application(s) Topical every 4 hours PRN        ==  A/P   Pt s/p , PPD#2 , stable, meeting postpartum milestones   #PECwoSF  - VSS overnight, VSQ4  #PPC   - Pain: well controlled on tylenol/motrin  - GI: Tolerating regular diet  - : urinating without difficulty/pain  - DVT prophylaxis: ambulating frequently  - Dispo: PPD 2, unless otherwise specified     Patient evaluated at bedside this morning, resting comfortable in bed, no acute events overnight. Vital signs stable overnight.  She reports pain is well controlled with tylenol and motrin.  She denies nausea, vomiting, heavy vaginal bleeding. She has been ambulating without assistance, voiding spontaneously. C/o muscle soreness on L side.  Tolerating food well, without nausea/vomit.      Physical Exam:  T(C): 37.1 (25 @ 06:00), Max: 37.1 (25 @ 06:00)  HR: 95 (25 @ 06:00) (95 - 106)  BP: 97/60 (25 @ 06:00) (97/60 - 125/83)  RR: 18 (25 @ :00) (18 - 18)  SpO2: 97% (25 @ 06:00) (97% - 99%)    GA: NAD, A&O x 3  Pulm: no increased work of breathing  Abd: soft, nontender, nondistended, no rebound or guarding, uterus firm. Mild tenderness to L side, ellicited by palpation   Extremities: no swelling or calf tenderness                          9.9    16.24 )-----------( 170      ( 2025 19:40 )             29.1         139  |  107  |  8   ----------------------------<  93  3.6   |  22  |  0.68    Ca    8.4      2025 19:40    TPro  5.0[L]  /  Alb  2.8[L]  /  TBili  0.2  /  DBili  x   /  AST  31  /  ALT  14  /  AlkPhos  109  -    acetaminophen     Tablet .. 975 milliGRAM(s) Oral <User Schedule>  benzocaine 20%/menthol 0.5% Spray 1 Spray(s) Topical every 6 hours PRN  dibucaine 1% Ointment 1 Application(s) Topical every 6 hours PRN  diphenhydrAMINE 25 milliGRAM(s) Oral every 6 hours PRN  diphtheria/tetanus/pertussis (acellular) Vaccine (Adacel) 0.5 milliLiter(s) IntraMuscular once  fentanyl (2 MICROgram(s)/mL) + bupivacaine 0.0625%  in 0.9% Sodium Chloride PCEA 250 milliLiter(s) Epidural PCA Continuous  hydrocortisone 1% Cream 1 Application(s) Topical every 6 hours PRN  ibuprofen  Tablet. 600 milliGRAM(s) Oral every 6 hours  lanolin Ointment 1 Application(s) Topical every 6 hours PRN  magnesium hydroxide Suspension 30 milliLiter(s) Oral two times a day PRN  oxyCODONE    IR 5 milliGRAM(s) Oral every 3 hours PRN  oxyCODONE    IR 5 milliGRAM(s) Oral once PRN  oxytocin Infusion 167 milliUNIT(s)/Min IV Continuous <Continuous>  pramoxine 1%/zinc 5% Cream 1 Application(s) Topical every 4 hours PRN  prenatal multivitamin 1 Tablet(s) Oral daily  simethicone 80 milliGRAM(s) Chew every 4 hours PRN  sodium chloride 0.9% lock flush 3 milliLiter(s) IV Push every 8 hours  witch hazel Pads 1 Application(s) Topical every 4 hours PRN        ==  A/P   Pt s/p , PPD#2 , stable, meeting postpartum milestones   #L side muscle soreness  - c/w routine analgesia  - heat packs to region   #PECwoSF  - VSS overnight, VSQ4  #PPC   - Pain: well controlled on tylenol/motrin  - GI: Tolerating regular diet  - : urinating without difficulty/pain  - DVT prophylaxis: ambulating frequently  - Dispo: PPD 2, unless otherwise specified     No

## 2025-06-27 ENCOUNTER — APPOINTMENT (OUTPATIENT)
Age: 45
End: 2025-06-27
Payer: COMMERCIAL

## 2025-06-27 PROCEDURE — S9443: CPT | Mod: 95

## 2025-06-29 DIAGNOSIS — M79.10 MYALGIA, UNSPECIFIED SITE: ICD-10-CM

## 2025-06-29 DIAGNOSIS — A60.00 HERPESVIRAL INFECTION OF UROGENITAL SYSTEM, UNSPECIFIED: ICD-10-CM

## 2025-06-29 DIAGNOSIS — Z3A.38 38 WEEKS GESTATION OF PREGNANCY: ICD-10-CM

## 2025-06-29 DIAGNOSIS — Z28.09 IMMUNIZATION NOT CARRIED OUT BECAUSE OF OTHER CONTRAINDICATION: ICD-10-CM
